# Patient Record
Sex: MALE | Race: OTHER | Employment: FULL TIME | URBAN - METROPOLITAN AREA
[De-identification: names, ages, dates, MRNs, and addresses within clinical notes are randomized per-mention and may not be internally consistent; named-entity substitution may affect disease eponyms.]

---

## 2017-07-20 PROBLEM — A60.00 HERPES SIMPLEX INFECTION OF GENITOURINARY SYSTEM: Status: ACTIVE | Noted: 2017-07-20

## 2017-07-20 PROBLEM — Z12.11 COLON CANCER SCREENING: Status: ACTIVE | Noted: 2017-07-20

## 2019-09-25 PROBLEM — Z12.11 COLON CANCER SCREENING: Status: RESOLVED | Noted: 2017-07-20 | Resolved: 2019-09-25

## 2019-10-04 PROBLEM — E78.5 DYSLIPIDEMIA: Status: ACTIVE | Noted: 2019-10-04

## 2019-10-04 PROBLEM — R05.9 COUGH: Status: ACTIVE | Noted: 2019-10-04

## 2019-11-21 ENCOUNTER — HOSPITAL ENCOUNTER (OUTPATIENT)
Dept: CT IMAGING | Age: 54
Discharge: HOME | End: 2019-11-21
Attending: UROLOGY
Payer: COMMERCIAL

## 2019-11-21 DIAGNOSIS — R31.29 MICROSCOPIC HEMATURIA: ICD-10-CM

## 2019-11-21 PROCEDURE — 74011636320 HC RX REV CODE- 636/320: Performed by: UROLOGY

## 2019-11-21 PROCEDURE — 74011000258 HC RX REV CODE- 258: Performed by: UROLOGY

## 2019-11-21 PROCEDURE — 74178 CT ABD&PLV WO CNTR FLWD CNTR: CPT

## 2019-11-21 RX ORDER — SODIUM CHLORIDE 0.9 % (FLUSH) 0.9 %
10 SYRINGE (ML) INJECTION
Status: COMPLETED | OUTPATIENT
Start: 2019-11-21 | End: 2019-11-21

## 2019-11-21 RX ADMIN — SODIUM CHLORIDE 100 ML: 900 INJECTION, SOLUTION INTRAVENOUS at 16:28

## 2019-11-21 RX ADMIN — Medication 10 ML: at 16:28

## 2019-11-21 RX ADMIN — IOPAMIDOL 100 ML: 755 INJECTION, SOLUTION INTRAVENOUS at 16:28

## 2019-11-21 NOTE — PROGRESS NOTES
Patient complains of itching after contrast injection. Monitored patient during exam and for 20 minutes after. He says he is not itching anymore, radiologist evaluated patient and he was discharged without additional medication.

## 2020-09-22 ENCOUNTER — HOSPITAL ENCOUNTER (OUTPATIENT)
Dept: LAB | Age: 55
Discharge: HOME | End: 2020-09-22
Payer: COMMERCIAL

## 2020-09-22 DIAGNOSIS — E78.5 DYSLIPIDEMIA: ICD-10-CM

## 2020-09-22 DIAGNOSIS — Z00.01 ENCOUNTER FOR GENERAL ADULT MEDICAL EXAMINATION WITH ABNORMAL FINDINGS: ICD-10-CM

## 2020-09-22 DIAGNOSIS — R73.03 DIABETES MELLITUS, LATENT: ICD-10-CM

## 2020-09-22 DIAGNOSIS — C95.91 LEUKEMIA IN REMISSION, UNSPECIFIED LEUKEMIA TYPE (HCC): ICD-10-CM

## 2020-09-22 DIAGNOSIS — Z79.899 ENCOUNTER FOR LONG-TERM (CURRENT) USE OF OTHER MEDICATIONS: ICD-10-CM

## 2020-09-22 LAB
ALBUMIN SERPL-MCNC: 4 G/DL (ref 3.5–5)
ALBUMIN/GLOB SERPL: 1.4 {RATIO} (ref 1.2–3.5)
ALP SERPL-CCNC: 73 U/L (ref 50–136)
ALT SERPL-CCNC: 32 U/L (ref 12–65)
ANION GAP SERPL CALC-SCNC: 5 MMOL/L (ref 7–16)
APPEARANCE UR: CLEAR
AST SERPL-CCNC: 16 U/L (ref 15–37)
BACTERIA URNS QL MICRO: 0 /HPF
BILIRUB SERPL-MCNC: 0.5 MG/DL (ref 0.2–1.1)
BILIRUB UR QL: NEGATIVE
BUN SERPL-MCNC: 15 MG/DL (ref 6–23)
CALCIUM SERPL-MCNC: 8.8 MG/DL (ref 8.3–10.4)
CASTS URNS QL MICRO: ABNORMAL /LPF
CHLORIDE SERPL-SCNC: 106 MMOL/L (ref 98–107)
CHOLEST SERPL-MCNC: 154 MG/DL
CO2 SERPL-SCNC: 27 MMOL/L (ref 21–32)
COLOR UR: YELLOW
CREAT SERPL-MCNC: 1.02 MG/DL (ref 0.8–1.5)
EPI CELLS #/AREA URNS HPF: 0 /HPF
ERYTHROCYTE [DISTWIDTH] IN BLOOD BY AUTOMATED COUNT: 13.8 % (ref 11.9–14.6)
EST. AVERAGE GLUCOSE BLD GHB EST-MCNC: 123 MG/DL
GLOBULIN SER CALC-MCNC: 2.9 G/DL (ref 2.3–3.5)
GLUCOSE SERPL-MCNC: 88 MG/DL (ref 65–100)
GLUCOSE UR STRIP.AUTO-MCNC: NEGATIVE MG/DL
HBA1C MFR BLD: 5.9 % (ref 4.8–6)
HCT VFR BLD AUTO: 43.8 % (ref 41.1–50.3)
HDLC SERPL-MCNC: 35 MG/DL (ref 40–60)
HDLC SERPL: 4.4 {RATIO}
HGB BLD-MCNC: 14.3 G/DL (ref 13.6–17.2)
HGB UR QL STRIP: ABNORMAL
KETONES UR QL STRIP.AUTO: NEGATIVE MG/DL
LDLC SERPL CALC-MCNC: 102.6 MG/DL
LEUKOCYTE ESTERASE UR QL STRIP.AUTO: NEGATIVE
LIPID PROFILE,FLP: ABNORMAL
MCH RBC QN AUTO: 30.9 PG (ref 26.1–32.9)
MCHC RBC AUTO-ENTMCNC: 32.6 G/DL (ref 31.4–35)
MCV RBC AUTO: 94.6 FL (ref 79.6–97.8)
NITRITE UR QL STRIP.AUTO: NEGATIVE
NRBC # BLD: 0 K/UL (ref 0–0.2)
PH UR STRIP: 6.5 [PH] (ref 5–9)
PLATELET # BLD AUTO: 122 K/UL (ref 150–450)
PMV BLD AUTO: 10.1 FL (ref 9.4–12.3)
POTASSIUM SERPL-SCNC: 4 MMOL/L (ref 3.5–5.1)
PROT SERPL-MCNC: 6.9 G/DL (ref 6.3–8.2)
PROT UR STRIP-MCNC: NEGATIVE MG/DL
RBC # BLD AUTO: 4.63 M/UL (ref 4.23–5.6)
RBC #/AREA URNS HPF: ABNORMAL /HPF
SODIUM SERPL-SCNC: 138 MMOL/L (ref 136–145)
SP GR UR REFRACTOMETRY: 1.02 (ref 1–1.02)
TRIGL SERPL-MCNC: 82 MG/DL (ref 35–150)
UROBILINOGEN UR QL STRIP.AUTO: 0.2 EU/DL (ref 0.2–1)
VLDLC SERPL CALC-MCNC: 16.4 MG/DL (ref 6–23)
WBC # BLD AUTO: 60.4 K/UL (ref 4.3–11.1)
WBC URNS QL MICRO: 0 /HPF

## 2020-09-22 PROCEDURE — 36415 COLL VENOUS BLD VENIPUNCTURE: CPT

## 2020-09-22 PROCEDURE — 80061 LIPID PANEL: CPT

## 2020-09-22 PROCEDURE — 80053 COMPREHEN METABOLIC PANEL: CPT

## 2020-09-22 PROCEDURE — 81001 URINALYSIS AUTO W/SCOPE: CPT

## 2020-09-22 PROCEDURE — 85027 COMPLETE CBC AUTOMATED: CPT

## 2020-09-22 PROCEDURE — 83036 HEMOGLOBIN GLYCOSYLATED A1C: CPT

## 2022-03-19 PROBLEM — R05.9 COUGH: Status: ACTIVE | Noted: 2019-10-04

## 2022-03-19 PROBLEM — E78.5 DYSLIPIDEMIA: Status: ACTIVE | Noted: 2019-10-04

## 2022-03-19 PROBLEM — A60.00 HERPES SIMPLEX INFECTION OF GENITOURINARY SYSTEM: Status: ACTIVE | Noted: 2017-07-20

## 2022-08-10 ENCOUNTER — HOSPITAL ENCOUNTER (EMERGENCY)
Age: 57
Discharge: HOME | End: 2022-08-10
Attending: EMERGENCY MEDICINE
Payer: COMMERCIAL

## 2022-08-10 ENCOUNTER — HOSPITAL ENCOUNTER (EMERGENCY)
Dept: GENERAL RADIOLOGY | Age: 57
Discharge: HOME | End: 2022-08-13
Payer: COMMERCIAL

## 2022-08-10 ENCOUNTER — HOSPITAL ENCOUNTER (EMERGENCY)
Dept: CT IMAGING | Age: 57
Discharge: HOME | End: 2022-08-13
Payer: COMMERCIAL

## 2022-08-10 VITALS
WEIGHT: 230 LBS | TEMPERATURE: 98.3 F | RESPIRATION RATE: 18 BRPM | HEIGHT: 69 IN | OXYGEN SATURATION: 96 % | DIASTOLIC BLOOD PRESSURE: 78 MMHG | BODY MASS INDEX: 34.07 KG/M2 | SYSTOLIC BLOOD PRESSURE: 116 MMHG | HEART RATE: 66 BPM
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
## 2022-08-10 DIAGNOSIS — S09.90XA CLOSED HEAD INJURY, INITIAL ENCOUNTER: ICD-10-CM

## 2022-08-10 DIAGNOSIS — S16.1XXA STRAIN OF NECK MUSCLE, INITIAL ENCOUNTER: ICD-10-CM

## 2022-08-10 DIAGNOSIS — M25.522 LEFT ELBOW PAIN: ICD-10-CM

## 2022-08-10 PROCEDURE — 70450 CT HEAD/BRAIN W/O DYE: CPT

## 2022-08-10 PROCEDURE — 73070 X-RAY EXAM OF ELBOW: CPT

## 2022-08-10 PROCEDURE — 72125 CT NECK SPINE W/O DYE: CPT

## 2022-08-10 PROCEDURE — 73502 X-RAY EXAM HIP UNI 2-3 VIEWS: CPT

## 2022-08-10 PROCEDURE — 99284 EMERGENCY DEPT VISIT MOD MDM: CPT

## 2022-08-10 RX ORDER — METHOCARBAMOL 500 MG/1
1000 TABLET, FILM COATED ORAL 3 TIMES DAILY
Qty: 42 TABLET | Refills: 0 | Status: SHIPPED | OUTPATIENT
Start: 2022-08-10 | End: 2022-08-17

## 2022-08-10 ASSESSMENT — ENCOUNTER SYMPTOMS
GASTROINTESTINAL NEGATIVE: 1
RESPIRATORY NEGATIVE: 1

## 2022-08-10 ASSESSMENT — PAIN - FUNCTIONAL ASSESSMENT: PAIN_FUNCTIONAL_ASSESSMENT: 0-10

## 2022-08-10 ASSESSMENT — PAIN SCALES - GENERAL: PAINLEVEL_OUTOF10: 6

## 2022-08-10 NOTE — ED NOTES
I have reviewed discharge instructions with the patient. The patient verbalized understanding. Patient left ED via Discharge Method: ambulatory to Home with self. Opportunity for questions and clarification provided. Patient given 1 scripts. To continue your aftercare when you leave the hospital, you may receive an automated call from our care team to check in on how you are doing. This is a free service and part of our promise to provide the best care and service to meet your aftercare needs.  If you have questions, or wish to unsubscribe from this service please call [phone]. Thank you for Choosing our [facility] Emergency Department.           Divya Valenzuela RN  08/10/22 4955
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
## 2022-08-10 NOTE — Clinical Note
Perez Cardenas was seen and treated in our emergency department on 8/10/2022. He may return to work on 08/11/2022. If you have any questions or concerns, please don't hesitate to call.       MACRINA Grady
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
## 2022-08-30 ENCOUNTER — TELEMEDICINE (OUTPATIENT)
Dept: PRIMARY CARE CLINIC | Facility: CLINIC | Age: 57
End: 2022-08-30
Payer: COMMERCIAL

## 2022-08-30 DIAGNOSIS — U07.1 COVID-19: Primary | ICD-10-CM

## 2022-08-30 DIAGNOSIS — C91.10 CHRONIC LYMPHOCYTIC LEUKEMIA (CLL) GENETIC MUTATION VARIANT (HCC): ICD-10-CM

## 2022-08-30 DIAGNOSIS — E66.01 SEVERE OBESITY (BMI 35.0-35.9 WITH COMORBIDITY) (HCC): ICD-10-CM

## 2022-08-30 DIAGNOSIS — Z87.891 EX-SMOKER: ICD-10-CM

## 2022-08-30 DIAGNOSIS — J44.1 COPD EXACERBATION (HCC): ICD-10-CM

## 2022-08-30 PROCEDURE — 99214 OFFICE O/P EST MOD 30 MIN: CPT | Performed by: FAMILY MEDICINE

## 2022-08-30 RX ORDER — NIRMATRELVIR AND RITONAVIR 300-100 MG
KIT ORAL
Qty: 30 TABLET | Refills: 0 | Status: SHIPPED | OUTPATIENT
Start: 2022-08-30 | End: 2022-09-04

## 2022-08-30 RX ORDER — AMOXICILLIN 500 MG/1
1000 CAPSULE ORAL 2 TIMES DAILY
Qty: 40 CAPSULE | Refills: 0 | Status: SHIPPED | OUTPATIENT
Start: 2022-08-30 | End: 2022-09-09

## 2022-08-30 RX ORDER — AZITHROMYCIN 250 MG/1
250 TABLET, FILM COATED ORAL SEE ADMIN INSTRUCTIONS
Qty: 6 TABLET | Refills: 0 | Status: SHIPPED | OUTPATIENT
Start: 2022-08-30 | End: 2022-09-04

## 2022-08-30 RX ORDER — MULTIVIT-MIN/IRON FUM/FOLIC AC 7.5 MG-4
1 TABLET ORAL DAILY
Qty: 100 TABLET | Refills: 3 | Status: SHIPPED | OUTPATIENT
Start: 2022-08-30 | End: 2022-10-11 | Stop reason: SDUPTHER

## 2022-08-30 ASSESSMENT — ENCOUNTER SYMPTOMS
TROUBLE SWALLOWING: 0
NAUSEA: 0
WHEEZING: 0
DIARRHEA: 0
RECTAL PAIN: 0
SORE THROAT: 1
COUGH: 1
ALLERGIC/IMMUNOLOGIC NEGATIVE: 1
SHORTNESS OF BREATH: 0

## 2022-08-30 ASSESSMENT — PATIENT HEALTH QUESTIONNAIRE - PHQ9
2. FEELING DOWN, DEPRESSED OR HOPELESS: 0
SUM OF ALL RESPONSES TO PHQ QUESTIONS 1-9: 0
1. LITTLE INTEREST OR PLEASURE IN DOING THINGS: 0
SUM OF ALL RESPONSES TO PHQ9 QUESTIONS 1 & 2: 0
SUM OF ALL RESPONSES TO PHQ QUESTIONS 1-9: 0

## 2022-08-30 NOTE — PATIENT INSTRUCTIONS
Upper respiratory infection cough congestion sinus drainage, and most ear infection bronchitis, or viral self-limiting, antibiotics are not necessary but sometimes prescribed due to patient concerns, continuing fluid hydration over-the-counter cough medicine could be taken at bedtime, may help some with cough, but most are ineffective, not recommended. Fluid hydration is more appropriate.   If the fever persisted productive phlegm, worsening symptoms after a few days further evaluation antibiotics as appropriate are necessary, need follow-up,  Recheck, if symptoms worsen particularly fever productive phlegm, malaise weakness, poor appetite  Acetaminophen ibuprofen, over-the-counter can be taken for aches and pains of fever as needed for 2-3 days,      Can follow-up with hematology for chronic lymphocytic leukemia

## 2022-08-30 NOTE — PROGRESS NOTES
Complains of fever sore throat cough with some productive phlegm no wheezing shortness of breath. He tested positive for COVID yesterday. Started symptoms 3 to 4 days ago. Previously had COVID in January and also vaccinated for Rashaad. He is at high risk due to history smoking also chronic lymphocytic leukemia. His last white cell count was 60. Recommended follow-up with hematology oncology. Obesity he denies any weight change. Review of Systems   Constitutional:  Positive for fever. HENT:  Positive for congestion and sore throat. Negative for dental problem, ear pain, mouth sores, postnasal drip and trouble swallowing. Respiratory:  Positive for cough. Negative for shortness of breath and wheezing. Gastrointestinal:  Negative for diarrhea, nausea and rectal pain. Endocrine: Negative. Genitourinary: Negative. Negative for decreased urine volume, difficulty urinating and urgency. Musculoskeletal: Negative. Allergic/Immunologic: Negative. Neurological:  Negative for speech difficulty, light-headedness and headaches. Hematological:  Negative for adenopathy. Does not bruise/bleed easily. Psychiatric/Behavioral:  Negative for agitation, behavioral problems and dysphoric mood. Physical Exam  Constitutional:       Appearance: Normal appearance. HENT:      Head: Normocephalic and atraumatic. Pulmonary:      Effort: Pulmonary effort is normal.   Musculoskeletal:      Cervical back: Normal range of motion. Skin:     Coloration: Skin is not jaundiced or pale. Findings: No bruising or lesion. Neurological:      General: No focal deficit present. Mental Status: He is alert. Mental status is at baseline. Coordination: Coordination normal.   Psychiatric:         Mood and Affect: Mood normal.      Fever cough however no significant wheezing shortness of breath some change in functional status but there are no obvious bruises. No pallor.   History chronic lymphocytic leukemia  1. COVID-19  COVID history chronic smoking COPD chronic bronchitis with acute exacerbation. We will treat with Paxil with antibiotics due to extremely high risk for complication including history chronic lymphocytic leukemia.  - azithromycin (ZITHROMAX) 250 MG tablet; Take 1 tablet by mouth See Admin Instructions for 5 days 500mg on day 1 followed by 250mg on days 2 - 5  Dispense: 6 tablet; Refill: 0  Upper respiratory infection cough congestion sinus drainage, and most ear infection bronchitis, or viral self-limiting, antibiotics are not necessary but sometimes prescribed due to patient concerns, continuing fluid hydration over-the-counter cough medicine could be taken at bedtime, may help some with cough, but most are ineffective, not recommended. Fluid hydration is more appropriate. If the fever persisted productive phlegm, worsening symptoms after a few days further evaluation antibiotics as appropriate are necessary, need follow-up,  Recheck, if symptoms worsen particularly fever productive phlegm, malaise weakness, poor appetite  Acetaminophen ibuprofen, over-the-counter can be taken for aches and pains of fever as needed for 2-3 days,    2. Chronic lymphocytic leukemia (CLL) genetic mutation variant (HCC)  Recommend follow-up with heme oncology due to history chronic lymphocytic leukemia monitoring management    3. Ex-smoker  Has quit smoking    4. COPD exacerbation (HCC)  Antibody recommended due to COPD sedation however minimal benefit from inhalers  - amoxicillin (AMOXIL) 500 MG capsule; Take 2 capsules by mouth 2 times daily for 10 days  Dispense: 40 capsule; Refill: 0  - nirmatrelvir/ritonavir (PAXLOVID, 300/100,) 20 x 150 MG & 10 x 100MG TBPK; Take 3 tablets (two 150 mg nirmatrelvir and one 100 mg ritonavir tablets) by mouth every 12 hours for 5 days. Dispense: 30 tablet; Refill: 0    5.  Severe obesity (BMI 35.0-35.9 with comorbidity) ([facility] [address]  Diet exercise weight management    Consent:  She and/or her healthcare decision maker is aware that this patient-initiated Telehealth encounter is a billable service, with coverage as determined by her insurance carrier. She is aware that she may receive a bill and has provided verbal consent to proceed: Yes    This virtual visit was conducted via [employer]. Pursuant to the emergency declaration under the [facility], [facility] waiver authority and the [employer] and [employer] Act, this Virtual  Visit was conducted to reduce the patient's risk of exposure to COVID-19 and provide continuity of care for an established patient. Services were provided through a video synchronous discussion virtually to substitute for in-person clinic visit. Due to this being a TeleHealth evaluation, many elements of the physical examination are unable to be assessed. Total Time: minutes: 20-25minutes.         Lisa Malloy MD

## 2022-10-11 ENCOUNTER — OFFICE VISIT (OUTPATIENT)
Dept: PRIMARY CARE CLINIC | Facility: CLINIC | Age: 57
End: 2022-10-11
Payer: COMMERCIAL

## 2022-10-11 VITALS
SYSTOLIC BLOOD PRESSURE: 109 MMHG | HEART RATE: 66 BPM | DIASTOLIC BLOOD PRESSURE: 78 MMHG | WEIGHT: 242 LBS | OXYGEN SATURATION: 95 % | HEIGHT: 69 IN | RESPIRATION RATE: 15 BRPM | BODY MASS INDEX: 35.84 KG/M2 | TEMPERATURE: 97.4 F

## 2022-10-11 DIAGNOSIS — Z11.4 ENCOUNTER FOR SCREENING FOR HIV: ICD-10-CM

## 2022-10-11 DIAGNOSIS — E66.01 SEVERE OBESITY (BMI 35.0-35.9 WITH COMORBIDITY) (HCC): ICD-10-CM

## 2022-10-11 DIAGNOSIS — E78.5 DYSLIPIDEMIA: ICD-10-CM

## 2022-10-11 DIAGNOSIS — Z11.59 ENCOUNTER FOR HEPATITIS C SCREENING TEST FOR LOW RISK PATIENT: ICD-10-CM

## 2022-10-11 DIAGNOSIS — Z00.01 ENCOUNTER FOR GENERAL ADULT MEDICAL EXAMINATION WITH ABNORMAL FINDINGS: Primary | ICD-10-CM

## 2022-10-11 DIAGNOSIS — N52.9 ERECTILE DYSFUNCTION, UNSPECIFIED ERECTILE DYSFUNCTION TYPE: ICD-10-CM

## 2022-10-11 DIAGNOSIS — Z00.01 ENCOUNTER FOR GENERAL ADULT MEDICAL EXAMINATION WITH ABNORMAL FINDINGS: ICD-10-CM

## 2022-10-11 DIAGNOSIS — R73.03 PRE-DIABETES: ICD-10-CM

## 2022-10-11 DIAGNOSIS — C91.10 CHRONIC LYMPHOCYTIC LEUKEMIA (CLL) GENETIC MUTATION VARIANT (HCC): ICD-10-CM

## 2022-10-11 DIAGNOSIS — Z79.899 MEDICATION MANAGEMENT: ICD-10-CM

## 2022-10-11 DIAGNOSIS — Z23 ENCOUNTER FOR IMMUNIZATION: ICD-10-CM

## 2022-10-11 DIAGNOSIS — H53.9 ABNORMAL VISION: ICD-10-CM

## 2022-10-11 PROBLEM — J44.1 COPD EXACERBATION (HCC): Status: RESOLVED | Noted: 2022-08-30 | Resolved: 2022-10-11

## 2022-10-11 PROBLEM — A60.00 HERPES SIMPLEX INFECTION OF GENITOURINARY SYSTEM: Status: RESOLVED | Noted: 2017-07-20 | Resolved: 2022-10-11

## 2022-10-11 PROBLEM — U07.1 COVID-19: Status: RESOLVED | Noted: 2022-08-30 | Resolved: 2022-10-11

## 2022-10-11 LAB
BASOPHILS # BLD: 0 K/UL (ref 0–0.2)
BASOPHILS NFR BLD: 0 % (ref 0–2)
BILIRUBIN, URINE, POC: NEGATIVE
BLOOD URINE, POC: ABNORMAL
DIFFERENTIAL METHOD BLD: ABNORMAL
EOSINOPHIL # BLD: 0 K/UL (ref 0–0.8)
EOSINOPHIL NFR BLD: 0 % (ref 0.5–7.8)
ERYTHROCYTE [DISTWIDTH] IN BLOOD BY AUTOMATED COUNT: 14.1 % (ref 11.9–14.6)
EST. AVERAGE GLUCOSE BLD GHB EST-MCNC: 120 MG/DL
GLUCOSE URINE, POC: NEGATIVE
HBA1C MFR BLD: 5.8 % (ref 4.8–5.6)
HCT VFR BLD AUTO: 44.2 % (ref 41.1–50.3)
HGB BLD-MCNC: 14 G/DL (ref 13.6–17.2)
IMM GRANULOCYTES # BLD AUTO: 0 K/UL (ref 0–0.5)
IMM GRANULOCYTES NFR BLD AUTO: 0 % (ref 0–5)
KETONES, URINE, POC: NEGATIVE
LEUKOCYTE ESTERASE, URINE, POC: NEGATIVE
LYMPHOCYTES # BLD: 64.3 K/UL (ref 0.5–4.6)
LYMPHOCYTES NFR BLD: 93 % (ref 13–44)
MCH RBC QN AUTO: 30.4 PG (ref 26.1–32.9)
MCHC RBC AUTO-ENTMCNC: 31.7 G/DL (ref 31.4–35)
MCV RBC AUTO: 95.9 FL (ref 82–102)
MONOCYTES # BLD: 1.4 K/UL (ref 0.1–1.3)
MONOCYTES NFR BLD: 2 % (ref 4–12)
NEUTS SEG # BLD: 3.5 K/UL (ref 1.7–8.2)
NEUTS SEG NFR BLD: 5 % (ref 43–78)
NITRITE, URINE, POC: NEGATIVE
NRBC # BLD: 0 K/UL (ref 0–0.2)
PH, URINE, POC: 6 (ref 4.6–8)
PLATELET # BLD AUTO: 88 K/UL (ref 150–450)
PLATELET COMMENT: ABNORMAL
PMV BLD AUTO: 10.2 FL (ref 9.4–12.3)
PROTEIN,URINE, POC: NEGATIVE
RBC # BLD AUTO: 4.61 M/UL (ref 4.23–5.6)
RBC MORPH BLD: ABNORMAL
RBC MORPH BLD: ABNORMAL
SPECIFIC GRAVITY, URINE, POC: 1.02 (ref 1–1.03)
URINALYSIS CLARITY, POC: CLEAR
URINALYSIS COLOR, POC: YELLOW
UROBILINOGEN, POC: ABNORMAL
WBC # BLD AUTO: 69.2 K/UL (ref 4.3–11.1)
WBC MORPH BLD: ABNORMAL

## 2022-10-11 PROCEDURE — 90674 CCIIV4 VAC NO PRSV 0.5 ML IM: CPT | Performed by: FAMILY MEDICINE

## 2022-10-11 PROCEDURE — 81003 URINALYSIS AUTO W/O SCOPE: CPT | Performed by: FAMILY MEDICINE

## 2022-10-11 PROCEDURE — 99396 PREV VISIT EST AGE 40-64: CPT | Performed by: FAMILY MEDICINE

## 2022-10-11 PROCEDURE — 90471 IMMUNIZATION ADMIN: CPT | Performed by: FAMILY MEDICINE

## 2022-10-11 RX ORDER — MULTIVIT-MIN/IRON FUM/FOLIC AC 7.5 MG-4
1 TABLET ORAL DAILY
Qty: 100 TABLET | Refills: 3 | Status: SHIPPED | OUTPATIENT
Start: 2022-10-11

## 2022-10-11 RX ORDER — SILDENAFIL 50 MG/1
TABLET, FILM COATED ORAL
Qty: 30 TABLET | Refills: 5 | Status: SHIPPED | OUTPATIENT
Start: 2022-10-11

## 2022-10-11 ASSESSMENT — ENCOUNTER SYMPTOMS
NAUSEA: 0
COLOR CHANGE: 0
DIARRHEA: 0
EYE REDNESS: 0
RHINORRHEA: 0
CONSTIPATION: 0
SHORTNESS OF BREATH: 0
WHEEZING: 0
SINUS PAIN: 0
VOICE CHANGE: 0
EYE PAIN: 0
EYE DISCHARGE: 0
PHOTOPHOBIA: 0
COUGH: 0
SINUS PRESSURE: 0
CHEST TIGHTNESS: 0
CHOKING: 0
TROUBLE SWALLOWING: 0
BACK PAIN: 0
ABDOMINAL PAIN: 0
ABDOMINAL DISTENTION: 0
BLOOD IN STOOL: 0
VOMITING: 0
SORE THROAT: 0

## 2022-10-11 ASSESSMENT — PATIENT HEALTH QUESTIONNAIRE - PHQ9
SUM OF ALL RESPONSES TO PHQ QUESTIONS 1-9: 0
SUM OF ALL RESPONSES TO PHQ9 QUESTIONS 1 & 2: 0
1. LITTLE INTEREST OR PLEASURE IN DOING THINGS: 0
SUM OF ALL RESPONSES TO PHQ QUESTIONS 1-9: 0
2. FEELING DOWN, DEPRESSED OR HOPELESS: 0
SUM OF ALL RESPONSES TO PHQ QUESTIONS 1-9: 0
SUM OF ALL RESPONSES TO PHQ QUESTIONS 1-9: 0

## 2022-10-11 NOTE — PROGRESS NOTES
Here for follow-up annual wellness exam.  Obesity BMI 35 dyslipidemia. History chronic lymphocytic leukemia. He is in remission and has follow-up with hematology. Complains of erectile dysfunction. His vision screen abnormal on the right side ophthalmology follow-up 20/200 left side normal.  He has seen ophthalmology last year apparently. His glasses were prescribed he is not wearing those. There is no suggestion significant cataract however need evaluation recheck. Discussed management of erectile dysfunction no urinary symptoms had a colonoscopy in the past and hernia repair no other significant surgeries. Family history significant for cancer, Hypertension cardiovascular disease    Review of Systems   Constitutional:  Negative for activity change, appetite change, chills, diaphoresis, fatigue, fever and unexpected weight change. HENT:  Negative for congestion, ear pain, hearing loss, nosebleeds, rhinorrhea, sinus pressure, sinus pain, sore throat, trouble swallowing and voice change. Eyes:  Negative for photophobia, pain, discharge, redness and visual disturbance. Respiratory:  Negative for cough, choking, chest tightness, shortness of breath and wheezing. Cardiovascular:  Negative for chest pain, palpitations and leg swelling. Gastrointestinal:  Negative for abdominal distention, abdominal pain, blood in stool, constipation, diarrhea, nausea and vomiting. Endocrine: Negative for cold intolerance, heat intolerance, polydipsia, polyphagia and polyuria. Genitourinary:  Negative for decreased urine volume, difficulty urinating, dysuria, enuresis, frequency, genital sores, hematuria, penile discharge, penile pain, testicular pain and urgency. Sometimes difficulty having erection   Musculoskeletal:  Negative for arthralgias, back pain, gait problem, joint swelling, myalgias and neck pain. Skin:  Negative for color change and rash.    Allergic/Immunologic: Negative for environmental allergies and food allergies. Neurological:  Negative for dizziness, tremors, seizures, syncope, speech difficulty, weakness, numbness and headaches. Hematological:  Negative for adenopathy. Does not bruise/bleed easily. Psychiatric/Behavioral:  Negative for agitation, behavioral problems, confusion, decreased concentration, dysphoric mood, hallucinations, self-injury, sleep disturbance and suicidal ideas. The patient is not nervous/anxious and is not hyperactive. Physical Exam     1. Encounter for general adult medical examination with abnormal findings  Annual physical with several medical problems including history chronic lymphocytic leukemia hematology follow-up  - Lipid Panel; Future  - CBC with Auto Differential; Future  - AMB POC BLOOD OCCULT QUAL FECAL HEMGLBN 1-3  - AMB POC URINALYSIS DIP STICK AUTO W/O MICRO    2. Encounter for immunization    - Influenza, FLUCELVAX, (age 10 mo+), IM, Preservative Free, 0.5 mL    3. Chronic lymphocytic [facility] (C[facility]) [facility] mutation [facility] ([facility]  Follow-up with hematology    4. Severe obesity (BMI 35.0-35.9 with comorbidity) ([facility]  Diabetic teaching, diet, increase vegetables- at least 5 portions a day, roughly half plate, beans, whole grains, grilled or baked white meats , dairy products, exercise at least an hr - brisk walk aerobic of choice, No sugar/ suggary drinks including juice/ fats/ fried foods.  starch- bread/ potato/ rice. ( It takes roughly 40 minute of walk  To burn a 12 oz regular drink/ juice). Cutting out 12 oz drink a day equals to roughly 4 lb weight a yr! Decrease screen time- TV, Video, computer , cell phones- recommended less than 2 hrs a day      5. Dyslipidemia  May benefit from statins  - Lipid Panel; Future low-cholesterol diet weight management    6. Medication management    - CBC with Auto Differential; Future  - Comprehensive Metabolic Panel; Future  - TSH; Future    7.  Encounter for hepatitis C screening test for low risk patient    - Hepatitis C Antibody; Future    8. Encounter for screening for HIV    - HIV 1/2 Ag/Ab, 4TH Generation,W Rflx Confirm; Future    9. Pre-diabetes  High risk for prediabetes diabetes check hemoglobin A1c diabetic diet weight management  - Hemoglobin A1C; Future    10. Abnormal vision  Abnormal vision right eye previously evaluated by ophthalmologist recheck, appropriate treatment evaluation  - [facility] - [id number] [facility]   . Increase fruits vegetables, fiber, whole grains, beans, exercise, tree nuts, will decrease risk of heart attacks and strokes, may reduce cancer risks     once a day multivitamin such as Cent[employer] silver store brand, due to benefit of folic acid vitamin D, has already mineral vitamin is recommended doses.   Multiple different vitamins not recommended may carry increased risk, including vitamin E, take foods rich in omega 3 and fibre, pills are not recommended, including omega 3 in high doses, may have increased risks     Jp Zayas MD

## 2022-10-11 NOTE — PATIENT INSTRUCTIONS
Diabetic teaching, diet, increase vegetables- at least 5 portions a day, roughly half plate, beans, whole grains, grilled or baked white meats , dairy products, exercise at least an hr - brisk walk aerobic of choice, No sugar/ suggary drinks including juice/ fats/ fried foods.  starch- bread/ potato/ rice. ( It takes roughly 40 minute of walk  To burn a 12 oz regular drink/ juice). Cutting out 12 oz drink a day equals to roughly 4 lb weight a yr! Decrease screen time- TV, Video, computer , cell phones- recommended less than 2 hrs a day  Type 2 diabetes is very common, obesity is the main reason for diabetes and  insulin resistance, most of the type 2 diabetes can be cured by weight management exercise. . Most type 2 diabetes has high insulin level  and high insulin level causes most of diabetic complications microvascular and macrovascular, damage to kidneys, eyes , cardiovascular , and neuropathy,, medications that correct insulin resistance such as metformin has been shown to decrease these complications by lowering insulin level and correcting insulin resistance. Frequent blood sugar checking is unnecessary    Frequent blood sugar checking is not necessity, normal person without diabetess fasting blood sugar is usually less than 105, after 3 -4 weeks of treatment, either diet alone, or diet and metformin, if fasting blood sugar less than 120, frequent BS checking is not necessary and continue diet exercise Metformin is enough. Starting metformin early and preventing diabetic complications. Exercise and weight management is most important    Adding insulin and continuing increasing dose,  not usually prevent diabetic complications .  Some newer medications that do not cause low BS, may help diabeted by supressing apetite and making pee sugar , may help loose weight ,  may be more beneficial when over weight, but are quite expensive and often not covered by insurance, long term benefits are not known, and do have lot of side effects and risks    High blood sugar less than 300 usually causes no symptoms and patient is unaware, of the diabetes, and causes a significant diabetic complications and #1 cause of losing legs , kidneys and eye sight and cardiovascular risk     Focusing on blood sugar does not prevent diabetic complication, but diet, exercise , weight management ,  metformin early on , do prevent diabetic complications    If insulin do become necessary, usually 30-40 unit long acting insulin taken bed time, with small frequent meals may be more beneficial, keeping fasting blood sugar less than 140, through diet , exercise, weight management and metformin- recommended as first line diabetic medication with GFR more than 30 by all most medical organizations, and need be continued with or without insulin. In normal weight persons BMI less than 25, may be insulin deficient and Insulin log acting usually less than 30 units may help , with or without metformin if fasting BS more than 140  . Increase fruits vegetables, fiber, whole grains, beans, exercise, tree nuts, will decrease risk of heart attacks and strokes, may reduce cancer risks     once a day multivitamin such as Centrum silver store brand, due to benefit of folic acid vitamin D, has already mineral vitamin is recommended doses. Multiple different vitamins not recommended may carry increased risk, including vitamin E, take foods rich in omega 3 and fibre, pills are not recommended, including omega 3 in high doses, may have increased risks     He will follow-up with oncology for chronic lymphocytic leukemia    Prostate cancer is extremely common in men, eventually all men may develop prostate cancer. Usually prostate cancer is very slow-growing, very few people may suffer from prostate cancer and very rarely die from prostate cancer, or treatment may have significant risks.   Prostate cancer screening not recommended by [facility] preventative services task force, and the risks of screening may exceed benefits assessed by [facility] preventative services task force,

## 2022-10-12 LAB
ALBUMIN SERPL-MCNC: 4 G/DL (ref 3.5–5)
ALBUMIN/GLOB SERPL: 1.7 {RATIO} (ref 0.4–1.6)
ALP SERPL-CCNC: 74 U/L (ref 50–136)
ALT SERPL-CCNC: 42 U/L (ref 12–65)
ANION GAP SERPL CALC-SCNC: 7 MMOL/L (ref 2–11)
AST SERPL-CCNC: 18 U/L (ref 15–37)
BILIRUB SERPL-MCNC: 0.5 MG/DL (ref 0.2–1.1)
BUN SERPL-MCNC: 13 MG/DL (ref 6–23)
CALCIUM SERPL-MCNC: 8.7 MG/DL (ref 8.3–10.4)
CHLORIDE SERPL-SCNC: 109 MMOL/L (ref 101–110)
CHOLEST SERPL-MCNC: 171 MG/DL
CO2 SERPL-SCNC: 23 MMOL/L (ref 21–32)
CREAT SERPL-MCNC: 1 MG/DL (ref 0.8–1.5)
GLOBULIN SER CALC-MCNC: 2.3 G/DL (ref 2.8–4.5)
GLUCOSE SERPL-MCNC: 96 MG/DL (ref 65–100)
HCV AB SER QL: NONREACTIVE
HDLC SERPL-MCNC: 30 MG/DL (ref 40–60)
HDLC SERPL: 5.7 {RATIO}
HIV 1+2 AB+HIV1 P24 AG SERPL QL IA: NONREACTIVE
HIV 1/2 RESULT COMMENT: NORMAL
LDLC SERPL CALC-MCNC: 102.4 MG/DL
POTASSIUM SERPL-SCNC: 3.9 MMOL/L (ref 3.5–5.1)
PROT SERPL-MCNC: 6.3 G/DL (ref 6.3–8.2)
SODIUM SERPL-SCNC: 139 MMOL/L (ref 133–143)
TRIGL SERPL-MCNC: 193 MG/DL (ref 35–150)
TSH, 3RD GENERATION: 2.21 UIU/ML (ref 0.36–3.74)
VLDLC SERPL CALC-MCNC: 38.6 MG/DL (ref 6–23)

## 2023-04-03 ENCOUNTER — OFFICE VISIT (OUTPATIENT)
Dept: PRIMARY CARE CLINIC | Facility: CLINIC | Age: 58
End: 2023-04-03
Payer: COMMERCIAL

## 2023-04-03 ENCOUNTER — TELEPHONE (OUTPATIENT)
Dept: PRIMARY CARE CLINIC | Facility: CLINIC | Age: 58
End: 2023-04-03

## 2023-04-03 VITALS
DIASTOLIC BLOOD PRESSURE: 75 MMHG | HEIGHT: 69 IN | TEMPERATURE: 97.6 F | SYSTOLIC BLOOD PRESSURE: 121 MMHG | BODY MASS INDEX: 36.14 KG/M2 | RESPIRATION RATE: 15 BRPM | HEART RATE: 62 BPM | OXYGEN SATURATION: 96 % | WEIGHT: 244 LBS

## 2023-04-03 DIAGNOSIS — R73.03 PRE-DIABETES: ICD-10-CM

## 2023-04-03 DIAGNOSIS — D69.6 THROMBOCYTOPENIA, UNSPECIFIED (HCC): ICD-10-CM

## 2023-04-03 DIAGNOSIS — R63.5 WEIGHT GAIN: ICD-10-CM

## 2023-04-03 DIAGNOSIS — E78.5 DYSLIPIDEMIA: ICD-10-CM

## 2023-04-03 DIAGNOSIS — N52.9 ERECTILE DYSFUNCTION, UNSPECIFIED ERECTILE DYSFUNCTION TYPE: ICD-10-CM

## 2023-04-03 DIAGNOSIS — M47.815 SPONDYLOSIS OF THORACOLUMBAR REGION WITHOUT MYELOPATHY OR RADICULOPATHY: ICD-10-CM

## 2023-04-03 DIAGNOSIS — E66.01 SEVERE OBESITY (BMI 35.0-39.9) WITH COMORBIDITY (HCC): ICD-10-CM

## 2023-04-03 DIAGNOSIS — C91.10 CHRONIC LYMPHOCYTIC LEUKEMIA (CLL) GENETIC MUTATION VARIANT (HCC): ICD-10-CM

## 2023-04-03 DIAGNOSIS — Z87.891 EX-SMOKER: ICD-10-CM

## 2023-04-03 DIAGNOSIS — M54.50 CHRONIC LEFT-SIDED LOW BACK PAIN, UNSPECIFIED WHETHER SCIATICA PRESENT: ICD-10-CM

## 2023-04-03 DIAGNOSIS — G89.29 CHRONIC LEFT-SIDED LOW BACK PAIN, UNSPECIFIED WHETHER SCIATICA PRESENT: ICD-10-CM

## 2023-04-03 DIAGNOSIS — M25.552 LEFT HIP PAIN: Primary | ICD-10-CM

## 2023-04-03 DIAGNOSIS — R10.9 LEFT FLANK PAIN: ICD-10-CM

## 2023-04-03 DIAGNOSIS — Z79.899 MEDICATION MANAGEMENT: ICD-10-CM

## 2023-04-03 DIAGNOSIS — E66.01 SEVERE OBESITY (BMI 35.0-35.9 WITH COMORBIDITY) (HCC): ICD-10-CM

## 2023-04-03 LAB
ALBUMIN SERPL-MCNC: 4.1 G/DL (ref 3.5–5)
ALBUMIN/GLOB SERPL: 1.6 (ref 0.4–1.6)
ALP SERPL-CCNC: 78 U/L (ref 50–136)
ALT SERPL-CCNC: 40 U/L (ref 12–65)
ANION GAP SERPL CALC-SCNC: 2 MMOL/L (ref 2–11)
AST SERPL-CCNC: 19 U/L (ref 15–37)
BASOPHILS # BLD: 0 K/UL (ref 0–0.2)
BASOPHILS NFR BLD: 0 % (ref 0–2)
BILIRUB SERPL-MCNC: 0.4 MG/DL (ref 0.2–1.1)
BILIRUBIN, URINE, POC: NEGATIVE
BLOOD URINE, POC: NORMAL
BUN SERPL-MCNC: 20 MG/DL (ref 6–23)
CALCIUM SERPL-MCNC: 9.1 MG/DL (ref 8.3–10.4)
CHLORIDE SERPL-SCNC: 106 MMOL/L (ref 101–110)
CHOLEST SERPL-MCNC: 193 MG/DL
CO2 SERPL-SCNC: 29 MMOL/L (ref 21–32)
CREAT SERPL-MCNC: 1 MG/DL (ref 0.8–1.5)
DIFFERENTIAL METHOD BLD: ABNORMAL
EOSINOPHIL # BLD: 0 K/UL (ref 0–0.8)
EOSINOPHIL NFR BLD: 0 % (ref 0.5–7.8)
ERYTHROCYTE [DISTWIDTH] IN BLOOD BY AUTOMATED COUNT: 14.2 % (ref 11.9–14.6)
EST. AVERAGE GLUCOSE BLD GHB EST-MCNC: 114 MG/DL
GLOBULIN SER CALC-MCNC: 2.6 G/DL (ref 2.8–4.5)
GLUCOSE SERPL-MCNC: 105 MG/DL (ref 65–100)
GLUCOSE URINE, POC: NEGATIVE
HBA1C MFR BLD: 5.6 % (ref 4.8–5.6)
HCT VFR BLD AUTO: 45.3 % (ref 41.1–50.3)
HDLC SERPL-MCNC: 28 MG/DL (ref 40–60)
HDLC SERPL: 6.9
HGB BLD-MCNC: 15 G/DL (ref 13.6–17.2)
IMM GRANULOCYTES # BLD AUTO: 0 K/UL (ref 0–0.5)
IMM GRANULOCYTES NFR BLD AUTO: 0 % (ref 0–5)
KETONES, URINE, POC: NEGATIVE
LDLC SERPL CALC-MCNC: 126.8 MG/DL
LEUKOCYTE ESTERASE, URINE, POC: NEGATIVE
LYMPHOCYTES # BLD: 64.1 K/UL (ref 0.5–4.6)
LYMPHOCYTES NFR BLD: 92 % (ref 13–44)
MCH RBC QN AUTO: 30.7 PG (ref 26.1–32.9)
MCHC RBC AUTO-ENTMCNC: 33.1 G/DL (ref 31.4–35)
MCV RBC AUTO: 92.6 FL (ref 82–102)
MONOCYTES # BLD: 1.4 K/UL (ref 0.1–1.3)
MONOCYTES NFR BLD: 2 % (ref 4–12)
NEUTS SEG # BLD: 4.2 K/UL (ref 1.7–8.2)
NEUTS SEG NFR BLD: 6 % (ref 43–78)
NITRITE, URINE, POC: NEGATIVE
NRBC # BLD: 0 K/UL (ref 0–0.2)
PH, URINE, POC: 5.5 (ref 4.6–8)
PLATELET # BLD AUTO: 103 K/UL (ref 150–450)
PLATELET COMMENT: ABNORMAL
PMV BLD AUTO: 10.6 FL (ref 9.4–12.3)
POTASSIUM SERPL-SCNC: 4.2 MMOL/L (ref 3.5–5.1)
PROT SERPL-MCNC: 6.7 G/DL (ref 6.3–8.2)
PROTEIN,URINE, POC: NEGATIVE
RBC # BLD AUTO: 4.89 M/UL (ref 4.23–5.6)
RBC MORPH BLD: ABNORMAL
SODIUM SERPL-SCNC: 137 MMOL/L (ref 133–143)
SPECIFIC GRAVITY, URINE, POC: 1.03 (ref 1–1.03)
TRIGL SERPL-MCNC: 191 MG/DL (ref 35–150)
TSH W FREE THYROID IF ABNORMAL: 1.28 UIU/ML (ref 0.36–3.74)
URINALYSIS CLARITY, POC: CLEAR
URINALYSIS COLOR, POC: YELLOW
UROBILINOGEN, POC: NORMAL
VLDLC SERPL CALC-MCNC: 38.2 MG/DL (ref 6–23)
WBC # BLD AUTO: 69.7 K/UL (ref 4.3–11.1)
WBC MORPH BLD: ABNORMAL

## 2023-04-03 PROCEDURE — 81003 URINALYSIS AUTO W/O SCOPE: CPT | Performed by: FAMILY MEDICINE

## 2023-04-03 PROCEDURE — 99214 OFFICE O/P EST MOD 30 MIN: CPT | Performed by: FAMILY MEDICINE

## 2023-04-03 RX ORDER — SILDENAFIL 50 MG/1
TABLET, FILM COATED ORAL
Qty: 30 TABLET | Refills: 5 | Status: SHIPPED | OUTPATIENT
Start: 2023-04-03

## 2023-04-03 SDOH — ECONOMIC STABILITY: INCOME INSECURITY: HOW HARD IS IT FOR YOU TO PAY FOR THE VERY BASICS LIKE FOOD, HOUSING, MEDICAL CARE, AND HEATING?: NOT HARD AT ALL

## 2023-04-03 SDOH — ECONOMIC STABILITY: FOOD INSECURITY: WITHIN THE PAST 12 MONTHS, YOU WORRIED THAT YOUR FOOD WOULD RUN OUT BEFORE YOU GOT MONEY TO BUY MORE.: NEVER TRUE

## 2023-04-03 SDOH — ECONOMIC STABILITY: HOUSING INSECURITY
IN THE LAST 12 MONTHS, WAS THERE A TIME WHEN YOU DID NOT HAVE A STEADY PLACE TO SLEEP OR SLEPT IN A SHELTER (INCLUDING NOW)?: NO

## 2023-04-03 SDOH — ECONOMIC STABILITY: FOOD INSECURITY: WITHIN THE PAST 12 MONTHS, THE FOOD YOU BOUGHT JUST DIDN'T LAST AND YOU DIDN'T HAVE MONEY TO GET MORE.: NEVER TRUE

## 2023-04-03 ASSESSMENT — PATIENT HEALTH QUESTIONNAIRE - PHQ9
SUM OF ALL RESPONSES TO PHQ QUESTIONS 1-9: 0
SUM OF ALL RESPONSES TO PHQ QUESTIONS 1-9: 0
1. LITTLE INTEREST OR PLEASURE IN DOING THINGS: 0
SUM OF ALL RESPONSES TO PHQ9 QUESTIONS 1 & 2: 0
2. FEELING DOWN, DEPRESSED OR HOPELESS: 0
SUM OF ALL RESPONSES TO PHQ QUESTIONS 1-9: 0
SUM OF ALL RESPONSES TO PHQ QUESTIONS 1-9: 0

## 2023-04-03 ASSESSMENT — ENCOUNTER SYMPTOMS
COLOR CHANGE: 0
COUGH: 0
CHEST TIGHTNESS: 0
SORE THROAT: 0
NAUSEA: 0
VOICE CHANGE: 0
BACK PAIN: 1
WHEEZING: 0
VOMITING: 0
SINUS PRESSURE: 0
SINUS PAIN: 0
ABDOMINAL DISTENTION: 0
CHOKING: 0
SHORTNESS OF BREATH: 0
EYE DISCHARGE: 0
DIARRHEA: 0
RHINORRHEA: 0
TROUBLE SWALLOWING: 0
EYE PAIN: 0
EYE REDNESS: 0
ABDOMINAL PAIN: 0
BLOOD IN STOOL: 0
PHOTOPHOBIA: 0
CONSTIPATION: 0

## 2023-04-03 NOTE — PROGRESS NOTES
Here for follow-up of her numerous medical problems. Complains of severe left flank buttock back pain it was bad last week however is better. Denies any hematuria or dysuria any fall injuries. Remote history of kidney stone. Last CT scan was normal.  He is still concerned for possibility of kidney stone. History erectile symptoms better on sildenafil. Obesity BMI 36 gained weight. Prediabetic hyperlipidemia. History chronic lymphocytic leukemia has upcoming hematology appointment follow-up. Previous history smoking. No smoking alcohol or drug abuse. He is up-to-date with colon cancer screening    Review of Systems   Constitutional:  Negative for activity change, appetite change, chills, diaphoresis, fatigue, fever and unexpected weight change. HENT:  Negative for congestion, ear pain, hearing loss, nosebleeds, rhinorrhea, sinus pressure, sinus pain, sore throat, trouble swallowing and voice change. Eyes:  Negative for photophobia, pain, discharge, redness and visual disturbance. Respiratory:  Negative for cough, choking, chest tightness, shortness of breath and wheezing. Cardiovascular:  Negative for chest pain, palpitations and leg swelling. Gastrointestinal:  Negative for abdominal distention, abdominal pain, blood in stool, constipation, diarrhea, nausea and vomiting. Endocrine: Negative for cold intolerance, polydipsia, polyphagia and polyuria. Genitourinary:  Positive for flank pain. Negative for decreased urine volume, difficulty urinating, dysuria, enuresis, frequency, genital sores, hematuria, penile discharge, penile pain, penile swelling, scrotal swelling, testicular pain and urgency. Musculoskeletal:  Positive for back pain and myalgias. Negative for arthralgias, gait problem, joint swelling, neck pain and neck stiffness. Skin:  Negative for color change, pallor, rash and wound. Allergic/Immunologic: Negative for environmental allergies and food allergies.    Neurological:

## 2023-04-03 NOTE — PATIENT INSTRUCTIONS
Metformin 500 mg once a day will help with the diabetes and may help lose weight. Ozempic 2 mg every week for weight loss if covered by insurance and will help for prediabetic, recommended.   Check with pharmacy if covered

## 2023-04-04 PROBLEM — D69.6 THROMBOCYTOPENIA, UNSPECIFIED (HCC): Status: ACTIVE | Noted: 2023-04-04

## 2023-04-05 ENCOUNTER — TELEPHONE (OUTPATIENT)
Dept: PRIMARY CARE CLINIC | Facility: CLINIC | Age: 58
End: 2023-04-05

## 2023-04-07 ENCOUNTER — TELEPHONE (OUTPATIENT)
Dept: PRIMARY CARE CLINIC | Facility: CLINIC | Age: 58
End: 2023-04-07

## 2023-04-07 NOTE — TELEPHONE ENCOUNTER
----- Message from Mary Grace Mcconnell MD sent at 4/4/2023  9:19 AM EDT -----  Labs okay keep follow-up with hematology oncology

## 2023-04-26 ENCOUNTER — TELEMEDICINE (OUTPATIENT)
Dept: PRIMARY CARE CLINIC | Facility: CLINIC | Age: 58
End: 2023-04-26
Payer: COMMERCIAL

## 2023-04-26 DIAGNOSIS — J01.91 ACUTE RECURRENT SINUSITIS, UNSPECIFIED LOCATION: Primary | ICD-10-CM

## 2023-04-26 DIAGNOSIS — R73.03 PRE-DIABETES: ICD-10-CM

## 2023-04-26 DIAGNOSIS — E66.01 SEVERE OBESITY (BMI 35.0-35.9 WITH COMORBIDITY) (HCC): ICD-10-CM

## 2023-04-26 DIAGNOSIS — J30.1 SEASONAL ALLERGIC RHINITIS DUE TO POLLEN: ICD-10-CM

## 2023-04-26 DIAGNOSIS — E78.5 DYSLIPIDEMIA: ICD-10-CM

## 2023-04-26 DIAGNOSIS — C91.10 CHRONIC LYMPHOCYTIC LEUKEMIA (CLL) GENETIC MUTATION VARIANT (HCC): ICD-10-CM

## 2023-04-26 PROCEDURE — 99213 OFFICE O/P EST LOW 20 MIN: CPT | Performed by: FAMILY MEDICINE

## 2023-04-26 RX ORDER — FLUTICASONE PROPIONATE 50 MCG
2 SPRAY, SUSPENSION (ML) NASAL DAILY
Qty: 48 G | Refills: 1 | Status: SHIPPED | OUTPATIENT
Start: 2023-04-26

## 2023-04-26 RX ORDER — AMOXICILLIN 500 MG/1
1000 CAPSULE ORAL 2 TIMES DAILY
Qty: 40 CAPSULE | Refills: 0 | Status: SHIPPED | OUTPATIENT
Start: 2023-04-26 | End: 2023-05-06

## 2023-04-26 ASSESSMENT — PATIENT HEALTH QUESTIONNAIRE - PHQ9
SUM OF ALL RESPONSES TO PHQ QUESTIONS 1-9: 0
SUM OF ALL RESPONSES TO PHQ9 QUESTIONS 1 & 2: 0
SUM OF ALL RESPONSES TO PHQ QUESTIONS 1-9: 0
1. LITTLE INTEREST OR PLEASURE IN DOING THINGS: 0
2. FEELING DOWN, DEPRESSED OR HOPELESS: 0
SUM OF ALL RESPONSES TO PHQ QUESTIONS 1-9: 0
SUM OF ALL RESPONSES TO PHQ QUESTIONS 1-9: 0

## 2023-04-26 ASSESSMENT — ENCOUNTER SYMPTOMS
COUGH: 1
EYES NEGATIVE: 1
APNEA: 0
STRIDOR: 0
GASTROINTESTINAL NEGATIVE: 1
SORE THROAT: 1
WHEEZING: 0
CHEST TIGHTNESS: 0
RHINORRHEA: 1

## 2023-04-26 NOTE — PROGRESS NOTES
Appropriations Act, this Virtual  Visit was conducted to reduce the patient's risk of exposure to COVID-19 and provide continuity of care for an established patient. Services were provided through a video synchronous discussion virtually to substitute for in-person clinic visit. Due to this being a TeleHealth evaluation, many elements of the physical examination are unable to be assessed. Total Time: minutes: 11-20 minutes.    MD Estephanie Sepulveda MD

## 2023-04-26 NOTE — PATIENT INSTRUCTIONS
Upper respiratory infection cough congestion sinus drainage, and most ear infection bronchitis, or viral self-limiting, antibiotics are not necessary but sometimes prescribed due to patient concerns, continuing fluid hydration over-the-counter cough medicine could be taken at bedtime, may help some with cough, but most are ineffective, not recommended. Fluid hydration is more appropriate.   If the fever persisted productive phlegm, worsening symptoms after a few days further evaluation antibiotics as appropriate are necessary, need follow-up,  Recheck, if symptoms worsen particularly fever productive phlegm, malaise weakness, poor appetite  Acetaminophen ibuprofen, over-the-counter can be taken for aches and pains of fever as needed for 2-3 days,

## 2023-06-30 ENCOUNTER — COMMUNITY OUTREACH (OUTPATIENT)
Dept: PRIMARY CARE CLINIC | Facility: CLINIC | Age: 58
End: 2023-06-30

## 2023-07-12 ENCOUNTER — OFFICE VISIT (OUTPATIENT)
Dept: PRIMARY CARE CLINIC | Facility: CLINIC | Age: 58
End: 2023-07-12
Payer: COMMERCIAL

## 2023-07-12 VITALS
SYSTOLIC BLOOD PRESSURE: 121 MMHG | HEART RATE: 53 BPM | OXYGEN SATURATION: 91 % | RESPIRATION RATE: 15 BRPM | TEMPERATURE: 97.7 F | WEIGHT: 237 LBS | DIASTOLIC BLOOD PRESSURE: 79 MMHG | HEIGHT: 69 IN | BODY MASS INDEX: 35.1 KG/M2

## 2023-07-12 DIAGNOSIS — E66.01 SEVERE OBESITY (BMI 35.0-35.9 WITH COMORBIDITY) (HCC): ICD-10-CM

## 2023-07-12 DIAGNOSIS — R42 DIZZINESS: ICD-10-CM

## 2023-07-12 DIAGNOSIS — D69.6 THROMBOCYTOPENIA, UNSPECIFIED (HCC): ICD-10-CM

## 2023-07-12 DIAGNOSIS — R73.03 PRE-DIABETES: ICD-10-CM

## 2023-07-12 DIAGNOSIS — D48.5 NEOPLASM OF UNCERTAIN BEHAVIOR OF SKIN: ICD-10-CM

## 2023-07-12 DIAGNOSIS — M79.10 MYALGIA: ICD-10-CM

## 2023-07-12 DIAGNOSIS — E78.5 DYSLIPIDEMIA: ICD-10-CM

## 2023-07-12 DIAGNOSIS — F41.9 ANXIETY: ICD-10-CM

## 2023-07-12 DIAGNOSIS — C91.10 CHRONIC LYMPHOCYTIC LEUKEMIA (CLL) GENETIC MUTATION VARIANT (HCC): Primary | ICD-10-CM

## 2023-07-12 DIAGNOSIS — Z87.891 EX-SMOKER: ICD-10-CM

## 2023-07-12 PROCEDURE — 99214 OFFICE O/P EST MOD 30 MIN: CPT | Performed by: FAMILY MEDICINE

## 2023-07-12 RX ORDER — DULOXETIN HYDROCHLORIDE 30 MG/1
30 CAPSULE, DELAYED RELEASE ORAL DAILY
Qty: 30 CAPSULE | Refills: 3 | Status: SHIPPED | OUTPATIENT
Start: 2023-07-12

## 2023-07-12 ASSESSMENT — ENCOUNTER SYMPTOMS
WHEEZING: 0
TROUBLE SWALLOWING: 0
CHOKING: 0
EYE REDNESS: 0
VOMITING: 0
CONSTIPATION: 0
COUGH: 0
NAUSEA: 0
ABDOMINAL PAIN: 0
SORE THROAT: 0
SHORTNESS OF BREATH: 0
DIARRHEA: 0
BACK PAIN: 0
ABDOMINAL DISTENTION: 0
PHOTOPHOBIA: 0
VOICE CHANGE: 0
CHEST TIGHTNESS: 0
RHINORRHEA: 0
SINUS PRESSURE: 0
EYE PAIN: 0
SINUS PAIN: 0
COLOR CHANGE: 0
BLOOD IN STOOL: 0
EYE DISCHARGE: 0

## 2023-07-12 NOTE — PATIENT INSTRUCTIONS
Dermatology follow-up for skin lesions. Cymbalta may help with myalgia and some anxiety. Take once a day multivitamins        Hematology oncology for chronic lymphocytic leukemia    Diabetic teaching, diet, increase vegetables- at least 5 portions a day, roughly half plate, beans, whole grains, grilled or baked white meats , dairy products, exercise at least an hr - brisk walk aerobic of choice, No sugar/ suggary drinks including juice/ fats/ fried foods.  starch- bread/ potato/ rice. ( It takes roughly 40 minute of walk  To burn a 12 oz regular drink/ juice). Cutting out 12 oz drink a day equals to roughly 4 lb weight a yr!  Decrease screen time- TV, Video, computer , cell phones- recommended less than 2 hrs a day

## 2023-07-12 NOTE — PROGRESS NOTES
Here for follow-up of her numerous medical problems chronic lymphocytic leukemia hemoglobin normal.  His hematologist oncology physician has retired. Will refer to heme-onc oncology oncology however no acute crisis this has been stable. Chronic multiple nonspecific pains. He wanted me to fill at[employer] papers sometimes he cannot go to work and also has occasional nonspecific dizziness. Recent lab test prediabetic hemoglobin A1c 5.6 continue diabetic diet. Obesity BMI 36. He has lost some weight. Hyperlipidemia continue low-cholesterol diet recent lab test reviewed discussed. He came in office with his daughter    Review of Systems   Constitutional:  Positive for fatigue. Negative for activity change, appetite change, chills, diaphoresis, fever and unexpected weight change. HENT:  Negative for congestion, ear pain, hearing loss, nosebleeds, rhinorrhea, sinus pressure, sinus pain, sore throat, trouble swallowing and voice change. Eyes:  Negative for photophobia, pain, discharge, redness and visual disturbance. Respiratory:  Negative for cough, choking, chest tightness, shortness of breath and wheezing. Cardiovascular:  Negative for chest pain, palpitations and leg swelling. Gastrointestinal:  Negative for abdominal distention, abdominal pain, blood in stool, constipation, diarrhea, nausea and vomiting. Endocrine: Negative for cold intolerance, heat intolerance, polydipsia, polyphagia and polyuria. Genitourinary:  Negative for difficulty urinating, dysuria, enuresis, flank pain, frequency, genital sores, hematuria, penile discharge, penile pain, penile swelling and urgency. Musculoskeletal:  Positive for myalgias. Negative for arthralgias, back pain, gait problem, joint swelling and neck pain. Skin:  Negative for color change, pallor, rash and wound. Allergic/Immunologic: Negative for environmental allergies, food allergies and immunocompromised state. Neurological:  Positive for dizziness.

## 2023-08-03 DIAGNOSIS — M25.552 LEFT HIP PAIN: ICD-10-CM

## 2023-08-03 NOTE — PROGRESS NOTES
New Patient Abstract    Reason for Referral: Chronic lymphocytic leukemia (CLL) genetic mutation variant     Referring Provider: Mina Verma MD    Primary Care Provider: Mina Verma MD    Family History of Cancer/Hematologic: Family history significant for mother with unknown cancer, father with unknown cancer and sister with unknown cancer. Presenting Symptoms: Abnormal blood smear, elevated WBC counts    Narrative with recent with Results/Procedures/Biopsies and Dates completed:     51-year-old male with history of CLL    3/16/23 - Met with Hematologist, Dr. Joey Moore ([facility])  History of CLL:  Previous seen by Dr. Janine Mac in May 2013 after an absence of almost 2 years. c/o acid reflux and abdominal pain, cough and SOB. Patient had an elevated WBC count of 23.6  His lymphocyte count was 15.6K. The peripheral smear suggested CLL. The flow cytometry confirmed CLL. He has adenopathy and splenomegaly, which will be a Stage II CLL. His wife wanted him to have a bone marrow test which was done in June of 2013. Bone marrow showed a hypercellular marrow with marked increase in lymphocytes which were atypical lymphocytes and were also in non-paratrabecular aggregates. Noted: The presence of 13q14 deletion in 84% of the cells is an unfavorable sign and the hypermutated IgVH is a favorable prognostic sign. Rise in the absolute lymphocyte count (ALC) from 16.8 to 60.7K in the between July 2013 and June 2019. Today, it is 58.8K. Initial worsening thrombocytopenia, which dropped and then became stable. ALC fell from 97 to 47K between June and September 2021. Was unsure due to recent COVID or the CLL. Today it is 91K. Mild COVID in August 2021 after vaccinations in early 2021. Probably due to his immunodeficient status. He had another infection in August 2022 and took a course of Paxlovid. Cholelithiasis and abdominal wall hernia.  He had surgery for the ventral hernia in November

## 2023-08-08 ENCOUNTER — CLINICAL DOCUMENTATION (OUTPATIENT)
Dept: ONCOLOGY | Age: 58
End: 2023-08-08

## 2023-08-08 ENCOUNTER — OFFICE VISIT (OUTPATIENT)
Dept: ONCOLOGY | Age: 58
End: 2023-08-08
Payer: COMMERCIAL

## 2023-08-08 ENCOUNTER — HOSPITAL ENCOUNTER (OUTPATIENT)
Dept: LAB | Age: 58
Discharge: HOME | End: 2023-08-11
Payer: COMMERCIAL

## 2023-08-08 VITALS
BODY MASS INDEX: 36.46 KG/M2 | WEIGHT: 240.6 LBS | RESPIRATION RATE: 16 BRPM | SYSTOLIC BLOOD PRESSURE: 114 MMHG | DIASTOLIC BLOOD PRESSURE: 73 MMHG | HEART RATE: 71 BPM | TEMPERATURE: 98.7 F | OXYGEN SATURATION: 97 % | HEIGHT: 68 IN

## 2023-08-08 DIAGNOSIS — C91.10 CHRONIC LYMPHOCYTIC LEUKEMIA (CLL) GENETIC MUTATION VARIANT (HCC): ICD-10-CM

## 2023-08-08 DIAGNOSIS — C91.10 CHRONIC LYMPHOCYTIC LEUKEMIA (CLL) GENETIC MUTATION VARIANT (HCC): Primary | ICD-10-CM

## 2023-08-08 LAB
ALBUMIN SERPL-MCNC: 3.9 G/DL (ref 3.5–5)
ALBUMIN/GLOB SERPL: 1.5 (ref 0.4–1.6)
ALP SERPL-CCNC: 70 U/L (ref 50–136)
ALT SERPL-CCNC: 41 U/L (ref 12–65)
ANION GAP SERPL CALC-SCNC: 4 MMOL/L (ref 2–11)
AST SERPL-CCNC: 18 U/L (ref 15–37)
BASOPHILS # BLD: 0 K/UL (ref 0–0.2)
BASOPHILS NFR BLD: 0 % (ref 0–2)
BILIRUB SERPL-MCNC: 0.6 MG/DL (ref 0.2–1.1)
BUN SERPL-MCNC: 18 MG/DL (ref 6–23)
CALCIUM SERPL-MCNC: 9.1 MG/DL (ref 8.3–10.4)
CHLORIDE SERPL-SCNC: 107 MMOL/L (ref 101–110)
CO2 SERPL-SCNC: 27 MMOL/L (ref 21–32)
CREAT SERPL-MCNC: 0.9 MG/DL (ref 0.8–1.5)
DIFFERENTIAL METHOD BLD: ABNORMAL
EOSINOPHIL # BLD: 0 K/UL (ref 0–0.8)
EOSINOPHIL NFR BLD: 0 % (ref 0.5–7.8)
ERYTHROCYTE [DISTWIDTH] IN BLOOD BY AUTOMATED COUNT: 13.6 % (ref 11.9–14.6)
GLOBULIN SER CALC-MCNC: 2.6 G/DL (ref 2.8–4.5)
GLUCOSE SERPL-MCNC: 95 MG/DL (ref 65–100)
HCT VFR BLD AUTO: 42.7 % (ref 41.1–50.3)
HGB BLD-MCNC: 13.8 G/DL (ref 13.6–17.2)
IMM GRANULOCYTES # BLD AUTO: 0 K/UL (ref 0–0.5)
IMM GRANULOCYTES NFR BLD AUTO: 0 % (ref 0–5)
LDH SERPL L TO P-CCNC: 126 U/L (ref 100–190)
LYMPHOCYTES # BLD: 54.5 K/UL (ref 0.5–4.6)
LYMPHOCYTES NFR BLD: 92 % (ref 13–44)
Lab: NORMAL
Lab: NORMAL
MCH RBC QN AUTO: 29.7 PG (ref 26.1–32.9)
MCHC RBC AUTO-ENTMCNC: 32.3 G/DL (ref 31.4–35)
MCV RBC AUTO: 91.8 FL (ref 82–102)
MONOCYTES # BLD: 0.6 K/UL (ref 0.1–1.3)
MONOCYTES NFR BLD: 1 % (ref 4–12)
NEUTS SEG # BLD: 4.1 K/UL (ref 1.7–8.2)
NEUTS SEG NFR BLD: 7 % (ref 43–78)
NRBC # BLD: 0 K/UL (ref 0–0.2)
PLATELET # BLD AUTO: 103 K/UL (ref 150–450)
PLATELET COMMENT: ABNORMAL
PMV BLD AUTO: 10 FL (ref 9.4–12.3)
POTASSIUM SERPL-SCNC: 4.2 MMOL/L (ref 3.5–5.1)
PROT SERPL-MCNC: 6.5 G/DL (ref 6.3–8.2)
RBC # BLD AUTO: 4.65 M/UL (ref 4.23–5.6)
RBC MORPH BLD: ABNORMAL
REFERENCE LAB: NORMAL
SODIUM SERPL-SCNC: 138 MMOL/L (ref 133–143)
WBC # BLD AUTO: 59.2 K/UL (ref 4.3–11.1)
WBC MORPH BLD: ABNORMAL

## 2023-08-08 PROCEDURE — 80053 COMPREHEN METABOLIC PANEL: CPT

## 2023-08-08 PROCEDURE — 99205 OFFICE O/P NEW HI 60 MIN: CPT | Performed by: INTERNAL MEDICINE

## 2023-08-08 PROCEDURE — 85025 COMPLETE CBC W/AUTO DIFF WBC: CPT

## 2023-08-08 PROCEDURE — 83615 LACTATE (LD) (LDH) ENZYME: CPT

## 2023-08-08 PROCEDURE — 36415 COLL VENOUS BLD VENIPUNCTURE: CPT

## 2023-08-08 ASSESSMENT — PATIENT HEALTH QUESTIONNAIRE - PHQ9
SUM OF ALL RESPONSES TO PHQ QUESTIONS 1-9: 0
1. LITTLE INTEREST OR PLEASURE IN DOING THINGS: 0
SUM OF ALL RESPONSES TO PHQ QUESTIONS 1-9: 0
2. FEELING DOWN, DEPRESSED OR HOPELESS: 0
SUM OF ALL RESPONSES TO PHQ9 QUESTIONS 1 & 2: 0
SUM OF ALL RESPONSES TO PHQ QUESTIONS 1-9: 0
SUM OF ALL RESPONSES TO PHQ QUESTIONS 1-9: 0

## 2023-08-08 NOTE — PROGRESS NOTES
showing hypercellular marrow with CLL: 13q deletion, hyper-mutated IgVH), previously seen and followed with Dr. Christiano Saldaña who has now retired. From July 2013 till June 2019 his ALC (absolute lymphocyte count) parish from 16-60 K. More recently had mild COVID 8/21 with reinfection in 8/22 for which she took Paxil with. Recently seen by PCP Dr. Neena Garcia, with WBC mostly stable at 69,000. Now referred to us to reestablish care with hematology. Reports feeling \"great\". Denies any B symptoms, new lumps or bumps. Reports on last visit with Dr. Christiano Saldaña, he was advised continued monitoring. In summary, middle-aged gentleman with longstanding history of CLL, stage II, without high risk features, monitored since 2013. No clinical evidence of progression or endorgan damage, and as such, continue monitoring    PLAN:  - Routine labs  - LDH  - Peripheral blood smear flow cytometry and CLL profile  - Abdominal ultrasound assess liver and spleen    RTC in 4 weeks with above, labs    Thank you Dr Neena Garcia for allowing us to paticipate in care of this pleasant patient. Please call w/ any quesions      Total time 60 min 50% in direct consultation about the patient's diagnosis and management. All questions answered.   Brice Titus MD  [address] [facility] Hematology Oncology  [address]  [address] [place] [address] Road  Office : [phone]  Fax : [phone]

## 2023-08-11 LAB
FLOW CYTOMETRY RESULTS: NORMAL
SPECIMEN SOURCE: NORMAL
TEST ORDERED: NORMAL

## 2023-09-13 ENCOUNTER — HOSPITAL ENCOUNTER (OUTPATIENT)
Dept: ULTRASOUND IMAGING | Age: 58
Discharge: HOME | End: 2023-09-16
Attending: INTERNAL MEDICINE

## 2023-09-13 DIAGNOSIS — C91.10 CHRONIC LYMPHOCYTIC LEUKEMIA (CLL) GENETIC MUTATION VARIANT (HCC): ICD-10-CM

## 2023-09-19 DIAGNOSIS — C91.10 CHRONIC LYMPHOCYTIC LEUKEMIA (CLL) GENETIC MUTATION VARIANT (HCC): Primary | ICD-10-CM

## 2023-09-20 ENCOUNTER — HOSPITAL ENCOUNTER (OUTPATIENT)
Dept: LAB | Age: 58
Discharge: HOME | End: 2023-09-23
Payer: COMMERCIAL

## 2023-09-20 ENCOUNTER — OFFICE VISIT (OUTPATIENT)
Dept: ONCOLOGY | Age: 58
End: 2023-09-20
Payer: COMMERCIAL

## 2023-09-20 ENCOUNTER — CLINICAL DOCUMENTATION (OUTPATIENT)
Dept: ONCOLOGY | Age: 58
End: 2023-09-20

## 2023-09-20 VITALS
HEART RATE: 72 BPM | SYSTOLIC BLOOD PRESSURE: 121 MMHG | HEIGHT: 67 IN | TEMPERATURE: 97.6 F | BODY MASS INDEX: 37.56 KG/M2 | RESPIRATION RATE: 16 BRPM | OXYGEN SATURATION: 96 % | DIASTOLIC BLOOD PRESSURE: 72 MMHG | WEIGHT: 239.3 LBS

## 2023-09-20 DIAGNOSIS — D72.820 LYMPHOCYTOSIS: ICD-10-CM

## 2023-09-20 DIAGNOSIS — C91.10 CHRONIC LYMPHOCYTIC LEUKEMIA (CLL) GENETIC MUTATION VARIANT (HCC): Primary | ICD-10-CM

## 2023-09-20 DIAGNOSIS — K76.0 FATTY LIVER: ICD-10-CM

## 2023-09-20 DIAGNOSIS — C91.10 CHRONIC LYMPHOCYTIC LEUKEMIA (CLL) GENETIC MUTATION VARIANT (HCC): ICD-10-CM

## 2023-09-20 PROBLEM — F33.1 MAJOR DEPRESSIVE DISORDER, RECURRENT, MODERATE (HCC): Status: ACTIVE | Noted: 2023-09-20

## 2023-09-20 PROBLEM — F33.9 MAJOR DEPRESSIVE DISORDER, RECURRENT, UNSPECIFIED (HCC): Status: ACTIVE | Noted: 2023-09-20

## 2023-09-20 PROBLEM — F33.0 MAJOR DEPRESSIVE DISORDER, RECURRENT, MILD (HCC): Status: ACTIVE | Noted: 2023-09-20

## 2023-09-20 PROBLEM — E11.9 TYPE 2 DIABETES MELLITUS (HCC): Status: ACTIVE | Noted: 2023-09-20

## 2023-09-20 LAB
ALBUMIN SERPL-MCNC: 4 G/DL (ref 3.5–5)
ALBUMIN/GLOB SERPL: 1.4 (ref 0.4–1.6)
ALP SERPL-CCNC: 73 U/L (ref 50–136)
ALT SERPL-CCNC: 39 U/L (ref 12–65)
ANION GAP SERPL CALC-SCNC: 5 MMOL/L (ref 2–11)
AST SERPL-CCNC: 23 U/L (ref 15–37)
BASOPHILS # BLD: 0 K/UL (ref 0–0.2)
BASOPHILS NFR BLD: 0 % (ref 0–2)
BILIRUB SERPL-MCNC: 0.8 MG/DL (ref 0.2–1.1)
BUN SERPL-MCNC: 20 MG/DL (ref 6–23)
CALCIUM SERPL-MCNC: 9 MG/DL (ref 8.3–10.4)
CHLORIDE SERPL-SCNC: 106 MMOL/L (ref 101–110)
CO2 SERPL-SCNC: 28 MMOL/L (ref 21–32)
CREAT SERPL-MCNC: 1 MG/DL (ref 0.8–1.5)
DIFFERENTIAL METHOD BLD: ABNORMAL
EOSINOPHIL # BLD: 0.6 K/UL (ref 0–0.8)
EOSINOPHIL NFR BLD: 1 % (ref 0.5–7.8)
ERYTHROCYTE [DISTWIDTH] IN BLOOD BY AUTOMATED COUNT: 13.7 % (ref 11.9–14.6)
GLOBULIN SER CALC-MCNC: 2.8 G/DL (ref 2.8–4.5)
GLUCOSE SERPL-MCNC: 119 MG/DL (ref 65–100)
HCT VFR BLD AUTO: 43.3 % (ref 41.1–50.3)
HGB BLD-MCNC: 14.1 G/DL (ref 13.6–17.2)
IMM GRANULOCYTES # BLD AUTO: 0 K/UL (ref 0–0.5)
IMM GRANULOCYTES NFR BLD AUTO: 0 % (ref 0–5)
LDH SERPL L TO P-CCNC: 176 U/L (ref 100–190)
LYMPHOCYTES # BLD: 56.2 K/UL (ref 0.5–4.6)
LYMPHOCYTES NFR BLD: 89 % (ref 13–44)
MCH RBC QN AUTO: 29.9 PG (ref 26.1–32.9)
MCHC RBC AUTO-ENTMCNC: 32.6 G/DL (ref 31.4–35)
MCV RBC AUTO: 91.9 FL (ref 82–102)
MONOCYTES # BLD: 1.3 K/UL (ref 0.1–1.3)
MONOCYTES NFR BLD: 2 % (ref 4–12)
NEUTS SEG # BLD: 5 K/UL (ref 1.7–8.2)
NEUTS SEG NFR BLD: 8 % (ref 43–78)
NRBC # BLD: 0 K/UL (ref 0–0.2)
PLATELET # BLD AUTO: 112 K/UL (ref 150–450)
PLATELET COMMENT: ABNORMAL
PMV BLD AUTO: 9.7 FL (ref 9.4–12.3)
POTASSIUM SERPL-SCNC: 4.2 MMOL/L (ref 3.5–5.1)
PROT SERPL-MCNC: 6.8 G/DL (ref 6.3–8.2)
RBC # BLD AUTO: 4.71 M/UL (ref 4.23–5.6)
RBC MORPH BLD: ABNORMAL
SODIUM SERPL-SCNC: 139 MMOL/L (ref 133–143)
WBC # BLD AUTO: 63.1 K/UL (ref 4.3–11.1)
WBC MORPH BLD: ABNORMAL

## 2023-09-20 PROCEDURE — 83615 LACTATE (LD) (LDH) ENZYME: CPT

## 2023-09-20 PROCEDURE — 99214 OFFICE O/P EST MOD 30 MIN: CPT | Performed by: INTERNAL MEDICINE

## 2023-09-20 PROCEDURE — 80053 COMPREHEN METABOLIC PANEL: CPT

## 2023-09-20 PROCEDURE — 85025 COMPLETE CBC W/AUTO DIFF WBC: CPT

## 2023-09-20 PROCEDURE — 36415 COLL VENOUS BLD VENIPUNCTURE: CPT

## 2023-09-20 ASSESSMENT — PATIENT HEALTH QUESTIONNAIRE - PHQ9
1. LITTLE INTEREST OR PLEASURE IN DOING THINGS: 0
SUM OF ALL RESPONSES TO PHQ9 QUESTIONS 1 & 2: 0
SUM OF ALL RESPONSES TO PHQ QUESTIONS 1-9: 0
2. FEELING DOWN, DEPRESSED OR HOPELESS: 0

## 2023-09-20 NOTE — PATIENT INSTRUCTIONS
0.8 K/UL Final    Basophils Absolute 09/20/2023 0.0  0.0 - 0.2 K/UL Final    Absolute Immature Granulocyte 09/20/2023 0.0  0.0 - 0.5 K/UL Final    RBC Comment 09/20/2023     Final                    Value:SLIGHT  POLYCHROMASIA      WBC Comment 09/20/2023 Result Confirmed By Smear    Final    Comment: MODERATE  ATYPICAL LYMPHOCYTES PRESENT  MODERATE  SMUDGE CELLS      Platelet Comment 09/60/2885 DECREASED    Final    Differential Type 09/20/2023 AUTOMATED    Final    Sodium 09/20/2023 139  133 - 143 mmol/L Final    Potassium 09/20/2023 4.2  3.5 - 5.1 mmol/L Final    Chloride 09/20/2023 106  101 - 110 mmol/L Final    CO2 09/20/2023 28  21 - 32 mmol/L Final    Anion Gap 09/20/2023 5  2 - 11 mmol/L Final    Glucose 09/20/2023 119 (H)  65 - 100 mg/dL Final    BUN 09/20/2023 20  6 - 23 MG/DL Final    Creatinine 09/20/2023 1.00  0.8 - 1.5 MG/DL Final    Est, Glom Filt Rate 09/20/2023 >60  >60 ml/min/1.73m2 Final    Comment:    Pediatric calculator link: [email or web address]. org/professionals/kdoqi/gfr_calculatorped     These results are not intended for use in patients <25years of age. eGFR results are calculated without a race factor using  the 2021 CKD-EPI equation. Careful clinical correlation is recommended, particularly when comparing to results calculated using previous equations. The CKD-EPI equation is less accurate in patients with extremes of muscle mass, extra-renal metabolism of creatinine, excessive creatine ingestion, or following therapy that affects renal tubular secretion.       Calcium 09/20/2023 9.0  8.3 - 10.4 MG/DL Final    Total Bilirubin 09/20/2023 0.8  0.2 - 1.1 MG/DL Final    ALT 09/20/2023 39  12 - 65 U/L Final    AST 09/20/2023 23  15 - 37 U/L Final    Alk Phosphatase 09/20/2023 73  50 - 136 U/L Final    Total Protein 09/20/2023 6.8  6.3 - 8.2 g/dL Final    Albumin 09/20/2023 4.0  3.5 - 5.0 g/dL Final    Globulin 09/20/2023 2.8  2.8 - 4.5 g/dL Final    Albumin/Globulin Ratio 09/20/2023 1.4

## 2023-09-20 NOTE — PROGRESS NOTES
paticipate in care of this pleasant patient.  Please call w/ any austin Shankar MD  [facility]  Hematology Oncology  [address]  [address] [place] [address]  Office : [phone]  Fax : [phone]

## 2023-10-04 ENCOUNTER — OFFICE VISIT (OUTPATIENT)
Dept: PRIMARY CARE CLINIC | Facility: CLINIC | Age: 58
End: 2023-10-04
Payer: COMMERCIAL

## 2023-10-04 VITALS
BODY MASS INDEX: 37.04 KG/M2 | WEIGHT: 236 LBS | SYSTOLIC BLOOD PRESSURE: 115 MMHG | DIASTOLIC BLOOD PRESSURE: 81 MMHG | HEIGHT: 67 IN | TEMPERATURE: 98 F | OXYGEN SATURATION: 95 % | RESPIRATION RATE: 15 BRPM | HEART RATE: 60 BPM

## 2023-10-04 DIAGNOSIS — J44.1 COPD EXACERBATION (HCC): ICD-10-CM

## 2023-10-04 DIAGNOSIS — J40 BRONCHITIS: Primary | ICD-10-CM

## 2023-10-04 DIAGNOSIS — F33.1 MAJOR DEPRESSIVE DISORDER, RECURRENT, MODERATE (HCC): ICD-10-CM

## 2023-10-04 DIAGNOSIS — C91.10 CHRONIC LYMPHOCYTIC LEUKEMIA (CLL) GENETIC MUTATION VARIANT (HCC): ICD-10-CM

## 2023-10-04 DIAGNOSIS — Z87.891 EX-SMOKER: ICD-10-CM

## 2023-10-04 DIAGNOSIS — Z23 ENCOUNTER FOR IMMUNIZATION: ICD-10-CM

## 2023-10-04 DIAGNOSIS — E66.01 SEVERE OBESITY (BMI 35.0-35.9 WITH COMORBIDITY) (HCC): ICD-10-CM

## 2023-10-04 DIAGNOSIS — E78.5 DYSLIPIDEMIA: ICD-10-CM

## 2023-10-04 DIAGNOSIS — J30.1 SEASONAL ALLERGIC RHINITIS DUE TO POLLEN: ICD-10-CM

## 2023-10-04 DIAGNOSIS — R73.03 PRE-DIABETES: ICD-10-CM

## 2023-10-04 PROBLEM — F33.9 MAJOR DEPRESSIVE DISORDER, RECURRENT, UNSPECIFIED (HCC): Status: RESOLVED | Noted: 2023-09-20 | Resolved: 2023-10-04

## 2023-10-04 PROBLEM — E11.9 TYPE 2 DIABETES MELLITUS (HCC): Status: RESOLVED | Noted: 2023-09-20 | Resolved: 2023-10-04

## 2023-10-04 PROBLEM — F33.0 MAJOR DEPRESSIVE DISORDER, RECURRENT, MILD (HCC): Status: RESOLVED | Noted: 2023-09-20 | Resolved: 2023-10-04

## 2023-10-04 PROCEDURE — 90674 CCIIV4 VAC NO PRSV 0.5 ML IM: CPT | Performed by: FAMILY MEDICINE

## 2023-10-04 PROCEDURE — 90471 IMMUNIZATION ADMIN: CPT | Performed by: FAMILY MEDICINE

## 2023-10-04 PROCEDURE — 99214 OFFICE O/P EST MOD 30 MIN: CPT | Performed by: FAMILY MEDICINE

## 2023-10-04 RX ORDER — AZITHROMYCIN 250 MG/1
250 TABLET, FILM COATED ORAL SEE ADMIN INSTRUCTIONS
Qty: 6 TABLET | Refills: 0 | Status: SHIPPED | OUTPATIENT
Start: 2023-10-04 | End: 2023-10-09

## 2023-10-04 RX ORDER — DEXAMETHASONE 0.5 MG/1
TABLET ORAL
Qty: 30 TABLET | Refills: 0 | Status: SHIPPED | OUTPATIENT
Start: 2023-10-04

## 2023-10-04 RX ORDER — FLUTICASONE PROPIONATE 50 MCG
2 SPRAY, SUSPENSION (ML) NASAL DAILY
Qty: 48 G | Refills: 1 | Status: SHIPPED | OUTPATIENT
Start: 2023-10-04

## 2023-10-04 ASSESSMENT — ENCOUNTER SYMPTOMS
BLOOD IN STOOL: 0
COUGH: 1
CONSTIPATION: 0
RHINORRHEA: 0
BACK PAIN: 0
CHOKING: 0
EYE DISCHARGE: 0
EYE PAIN: 0
VOMITING: 0
SHORTNESS OF BREATH: 0
SINUS PRESSURE: 0
DIARRHEA: 0
SORE THROAT: 0
SINUS PAIN: 0
CHEST TIGHTNESS: 0
ABDOMINAL DISTENTION: 0
EYE REDNESS: 0
WHEEZING: 0
ABDOMINAL PAIN: 0
TROUBLE SWALLOWING: 0
COLOR CHANGE: 0
VOICE CHANGE: 0
PHOTOPHOBIA: 0
NAUSEA: 0

## 2023-10-04 ASSESSMENT — PATIENT HEALTH QUESTIONNAIRE - PHQ9
SUM OF ALL RESPONSES TO PHQ QUESTIONS 1-9: 0
2. FEELING DOWN, DEPRESSED OR HOPELESS: 0
SUM OF ALL RESPONSES TO PHQ QUESTIONS 1-9: 0
1. LITTLE INTEREST OR PLEASURE IN DOING THINGS: 0
SUM OF ALL RESPONSES TO PHQ QUESTIONS 1-9: 0
SUM OF ALL RESPONSES TO PHQ QUESTIONS 1-9: 0
SUM OF ALL RESPONSES TO PHQ9 QUESTIONS 1 & 2: 0

## 2023-10-04 NOTE — PROGRESS NOTES
Here for follow-up for numerous medical problems complains of cough with sputum for 1 week. History smoking. Hyperlipidemia dyslipidemia low HDL prediabetic last hemoglobin A1c 5.6 come down continue metformin diabetic diet. Upcoming hematology appointment for chronic lymphocytic leukemia. Anxiety depression better not taking Cymbalta. He will take flu shot preventative care discussed. He is not diabetic    Review of Systems   Constitutional:  Negative for activity change, appetite change, chills, diaphoresis, fatigue, fever and unexpected weight change. HENT:  Negative for congestion, ear pain, hearing loss, nosebleeds, rhinorrhea, sinus pressure, sinus pain, sore throat, trouble swallowing and voice change. Eyes:  Negative for photophobia, pain, discharge, redness and visual disturbance. Respiratory:  Positive for cough. Negative for choking, chest tightness, shortness of breath and wheezing. Cardiovascular:  Negative for chest pain, palpitations and leg swelling. Gastrointestinal:  Negative for abdominal distention, abdominal pain, blood in stool, constipation, diarrhea, nausea and vomiting. Endocrine: Negative for cold intolerance, heat intolerance, polydipsia, polyphagia and polyuria. Genitourinary:  Negative for difficulty urinating, dysuria, frequency, genital sores, hematuria, penile discharge, penile pain, testicular pain and urgency. Musculoskeletal:  Negative for arthralgias, back pain, gait problem, joint swelling, myalgias and neck pain. Skin:  Negative for color change and rash. Allergic/Immunologic: Negative for environmental allergies and immunocompromised state. Neurological:  Negative for dizziness, tremors, seizures, syncope, speech difficulty, weakness, numbness and headaches. Hematological:  Negative for adenopathy. Does not bruise/bleed easily.    Psychiatric/Behavioral:  Negative for behavioral problems, confusion, decreased concentration, hallucinations,

## 2024-01-05 ENCOUNTER — OFFICE VISIT (OUTPATIENT)
Dept: GASTROENTEROLOGY | Age: 59
End: 2024-01-05
Payer: COMMERCIAL

## 2024-01-05 VITALS
TEMPERATURE: 97.1 F | OXYGEN SATURATION: 96 % | HEIGHT: 67 IN | BODY MASS INDEX: 37.83 KG/M2 | HEART RATE: 75 BPM | WEIGHT: 241 LBS | SYSTOLIC BLOOD PRESSURE: 118 MMHG | RESPIRATION RATE: 16 BRPM | DIASTOLIC BLOOD PRESSURE: 72 MMHG

## 2024-01-05 DIAGNOSIS — K80.20 GALLBLADDER STONE WITHOUT CHOLECYSTITIS OR OBSTRUCTION: ICD-10-CM

## 2024-01-05 DIAGNOSIS — K76.0 FATTY LIVER: Primary | ICD-10-CM

## 2024-01-05 PROCEDURE — 99203 OFFICE O/P NEW LOW 30 MIN: CPT | Performed by: INTERNAL MEDICINE

## 2024-02-23 DIAGNOSIS — C91.10 CHRONIC LYMPHOCYTIC LEUKEMIA (CLL) GENETIC MUTATION VARIANT (HCC): Primary | ICD-10-CM

## 2024-04-11 ENCOUNTER — CLINICAL DOCUMENTATION (OUTPATIENT)
Dept: ONCOLOGY | Age: 59
End: 2024-04-11

## 2024-04-11 ENCOUNTER — OFFICE VISIT (OUTPATIENT)
Dept: ONCOLOGY | Age: 59
End: 2024-04-11
Payer: COMMERCIAL

## 2024-04-11 ENCOUNTER — HOSPITAL ENCOUNTER (OUTPATIENT)
Dept: LAB | Age: 59
Discharge: HOME | End: 2024-04-11
Payer: COMMERCIAL

## 2024-04-11 VITALS
TEMPERATURE: 97.5 F | SYSTOLIC BLOOD PRESSURE: 124 MMHG | HEART RATE: 72 BPM | DIASTOLIC BLOOD PRESSURE: 84 MMHG | OXYGEN SATURATION: 97 % | WEIGHT: 239.7 LBS | BODY MASS INDEX: 37.62 KG/M2 | RESPIRATION RATE: 16 BRPM | HEIGHT: 67 IN

## 2024-04-11 DIAGNOSIS — E66.01 SEVERE OBESITY (BMI 35.0-39.9) WITH COMORBIDITY (HCC): ICD-10-CM

## 2024-04-11 DIAGNOSIS — C91.10 CHRONIC LYMPHOCYTIC LEUKEMIA (CLL) GENETIC MUTATION VARIANT (HCC): Primary | ICD-10-CM

## 2024-04-11 DIAGNOSIS — D72.820 LYMPHOCYTOSIS: ICD-10-CM

## 2024-04-11 DIAGNOSIS — D69.6 THROMBOCYTOPENIA, UNSPECIFIED (HCC): ICD-10-CM

## 2024-04-11 DIAGNOSIS — K76.0 FATTY LIVER: ICD-10-CM

## 2024-04-11 DIAGNOSIS — C91.10 CHRONIC LYMPHOCYTIC LEUKEMIA (CLL) GENETIC MUTATION VARIANT (HCC): ICD-10-CM

## 2024-04-11 LAB
ALBUMIN SERPL-MCNC: 3.9 G/DL (ref 3.5–5)
ALBUMIN/GLOB SERPL: 1.5 (ref 0.4–1.6)
ALP SERPL-CCNC: 77 U/L (ref 50–136)
ALT SERPL-CCNC: 39 U/L (ref 12–65)
ANION GAP SERPL CALC-SCNC: 5 MMOL/L (ref 2–11)
AST SERPL-CCNC: 21 U/L (ref 15–37)
BASOPHILS # BLD: 0 K/UL (ref 0–0.2)
BASOPHILS NFR BLD: 0 % (ref 0–2)
BILIRUB SERPL-MCNC: 0.5 MG/DL (ref 0.2–1.1)
BUN SERPL-MCNC: 12 MG/DL (ref 6–23)
CALCIUM SERPL-MCNC: 9 MG/DL (ref 8.3–10.4)
CHLORIDE SERPL-SCNC: 110 MMOL/L (ref 103–113)
CO2 SERPL-SCNC: 25 MMOL/L (ref 21–32)
CREAT SERPL-MCNC: 1 MG/DL (ref 0.8–1.5)
DIFFERENTIAL METHOD BLD: ABNORMAL
EOSINOPHIL # BLD: 0.7 K/UL (ref 0–0.8)
EOSINOPHIL NFR BLD: 1 % (ref 0.5–7.8)
ERYTHROCYTE [DISTWIDTH] IN BLOOD BY AUTOMATED COUNT: 14.2 % (ref 11.9–14.6)
GLOBULIN SER CALC-MCNC: 2.6 G/DL (ref 2.8–4.5)
GLUCOSE SERPL-MCNC: 105 MG/DL (ref 65–100)
HCT VFR BLD AUTO: 45.1 % (ref 41.1–50.3)
HGB BLD-MCNC: 14.6 G/DL (ref 13.6–17.2)
IMM GRANULOCYTES # BLD AUTO: 0 K/UL (ref 0–0.5)
IMM GRANULOCYTES NFR BLD AUTO: 0 % (ref 0–5)
LDH SERPL L TO P-CCNC: 140 U/L (ref 100–190)
LYMPHOCYTES # BLD: 67.3 K/UL (ref 0.5–4.6)
LYMPHOCYTES NFR BLD: 93 % (ref 13–44)
MCH RBC QN AUTO: 29.9 PG (ref 26.1–32.9)
MCHC RBC AUTO-ENTMCNC: 32.4 G/DL (ref 31.4–35)
MCV RBC AUTO: 92.4 FL (ref 82–102)
MONOCYTES # BLD: 0.7 K/UL (ref 0.1–1.3)
MONOCYTES NFR BLD: 1 % (ref 4–12)
NEUTS SEG # BLD: 3.6 K/UL (ref 1.7–8.2)
NEUTS SEG NFR BLD: 5 % (ref 43–78)
NRBC # BLD: 0 K/UL (ref 0–0.2)
PLATELET # BLD AUTO: 102 K/UL (ref 150–450)
PLATELET COMMENT: ABNORMAL
PMV BLD AUTO: 9.9 FL (ref 9.4–12.3)
POTASSIUM SERPL-SCNC: 4.3 MMOL/L (ref 3.5–5.1)
PROT SERPL-MCNC: 6.5 G/DL (ref 6.3–8.2)
RBC # BLD AUTO: 4.88 M/UL (ref 4.23–5.6)
RBC MORPH BLD: ABNORMAL
SODIUM SERPL-SCNC: 140 MMOL/L (ref 136–146)
WBC # BLD AUTO: 72.3 K/UL (ref 4.3–11.1)
WBC MORPH BLD: ABNORMAL

## 2024-04-11 PROCEDURE — 99214 OFFICE O/P EST MOD 30 MIN: CPT | Performed by: INTERNAL MEDICINE

## 2024-04-11 PROCEDURE — 83615 LACTATE (LD) (LDH) ENZYME: CPT

## 2024-04-11 PROCEDURE — 85025 COMPLETE CBC W/AUTO DIFF WBC: CPT

## 2024-04-11 PROCEDURE — 80053 COMPREHEN METABOLIC PANEL: CPT

## 2024-04-11 PROCEDURE — 36415 COLL VENOUS BLD VENIPUNCTURE: CPT

## 2024-04-11 RX ORDER — VALACYCLOVIR HYDROCHLORIDE 1 G/1
TABLET, FILM COATED ORAL
COMMUNITY
Start: 2024-03-27

## 2024-04-11 ASSESSMENT — PATIENT HEALTH QUESTIONNAIRE - PHQ9
SUM OF ALL RESPONSES TO PHQ QUESTIONS 1-9: 0
2. FEELING DOWN, DEPRESSED OR HOPELESS: NOT AT ALL
1. LITTLE INTEREST OR PLEASURE IN DOING THINGS: NOT AT ALL
SUM OF ALL RESPONSES TO PHQ9 QUESTIONS 1 & 2: 0

## 2024-04-11 NOTE — PATIENT INSTRUCTIONS
Patient Information from Today's Visit    - labs reviewed. No need to start treatment at this time, we will continue to monitor you regularly.    - If you get any of the following symptoms you should call us and we will see you sooner:   -drenching night sweats   -unexplained fevers   -unexplained weight loss   -new enlarged lymph nodes     Treatment Summary has been discussed and given to patient:No  Follow Up: in 4 months     Please refer to After Visit Summary or [facility] for upcoming appointment information. If you have any questions regarding your upcoming schedule please reach out to your care team through [facility] or call [phone].          -------------------------------------------------------------------------------------------------------------------  Please call our office at [phone] if you have any  of the following symptoms:   Fever of 100.5 or greater  Chills  Shortness of breath  Swelling or pain in one leg    After office hours an answering service is available and will contact a provider for emergencies or if you are experiencing any of the above symptoms.    Patient did express an interest in My Chart.  My Chart log in information explained on the after visit summary printout at the check-out desk.    Gayathri Friend RN     Hospital Outpatient Visit on 04/11/2024   Component Date Value Ref Range Status    LD 04/11/2024 140  100 - 190 U/L Final    Sodium 04/11/2024 140  136 - 146 mmol/L Final    Potassium 04/11/2024 4.3  3.5 - 5.1 mmol/L Final    Chloride 04/11/2024 110  103 - 113 mmol/L Final    CO2 04/11/2024 25  21 - 32 mmol/L Final    Anion Gap 04/11/2024 5  2 - 11 mmol/L Final    Glucose 04/11/2024 105 (H)  65 - 100 mg/dL Final    BUN 04/11/2024 12  6 - 23 MG/DL Final    Creatinine 04/11/2024 1.00  0.8 - 1.5 MG/DL Final    Est, Glom Filt Rate 04/11/2024 87  >60 ml/min/1.73m2 Final    Comment:    Pediatric calculator link:

## 2024-04-11 NOTE — PROGRESS NOTES
Father     Cancer Father     Cancer Sister     Hypertension Sister     Heart Disease Brother        No Known Allergies    PHYSICAL EXAMINATION:  General Appearance: Healthy appearing patient in no acute distress  Vitals reviewed.   /84   Pulse 72   Temp 97.5 °F (36.4 °C) (Oral)   Resp 16   Ht 1.702 m (5' 7\")   Wt 108.7 kg (239 lb 11.2 oz)   SpO2 97%   BMI 37.54 kg/m²   HEENT: No oral or pharyngeal masses, ulceration or thrush noted, no sinus tenderness.  Neck is supple with no thyromegaly or JVD noted.  Lymph Nodes: No lymphadenopathy noted in the occipital, pre and post auricular, cervical, supra and infraclavicular, axillary, epitrochlear, inguinal, and popliteal region.  Breasts: No palpable masses, nipple discharge or skin retraction  Lungs/Thorax: Clear to auscultation, no accessory muscles of respiration being used.  Heart: Regular rate and rhythm, normal S1, S2, no appreciable murmurs, rubs, gallops  Abdomen: Soft, nontender, bowel sounds present, no appreciable hepatosplenomegaly, no palpable masses  Extremeties: Good pulses bilaterally, no peripheral edema.  Skin: Normal skin tone with no rash, petechiae, ecchymosis noted.  Musculoskeletal: No pain on palpation over bony prominence, no edema, no evidence of gout, no joint or bony deformity  Neurologic: Grossly intact        LABS/IMAGING:    Lab Results   Component Value Date/Time    WBC 72.3 04/11/2024 08:40 AM    HGB 14.6 04/11/2024 08:40 AM    HCT 45.1 04/11/2024 08:40 AM     04/11/2024 08:40 AM    MCV 92.4 04/11/2024 08:40 AM       Lab Results   Component Value Date/Time     04/11/2024 08:40 AM    K 4.3 04/11/2024 08:40 AM     04/11/2024 08:40 AM    CO2 25 04/11/2024 08:40 AM    BUN 12 04/11/2024 08:40 AM    GFRAA >60 09/22/2020 11:36 AM    GLOB 2.6 04/11/2024 08:40 AM    ALT 39 04/11/2024 08:40 AM             Above results reviewed with patient.    ASSESSMENT:  58-year-old male, history of cholelithiasis, abdominal wall

## 2024-04-11 NOTE — PROGRESS NOTES
Critical lab of WBC-72.3 received from laboratory, read back and verified. Provider and Clinical support staff notified of critical lab value needing to be addressed at today's visit and receipt confirmed.

## 2024-08-29 ENCOUNTER — OFFICE VISIT (OUTPATIENT)
Dept: ONCOLOGY | Age: 59
End: 2024-08-29
Payer: COMMERCIAL

## 2024-08-29 ENCOUNTER — HOSPITAL ENCOUNTER (OUTPATIENT)
Dept: LAB | Age: 59
Discharge: HOME | End: 2024-08-29
Payer: COMMERCIAL

## 2024-08-29 ENCOUNTER — CLINICAL DOCUMENTATION (OUTPATIENT)
Dept: ONCOLOGY | Age: 59
End: 2024-08-29

## 2024-08-29 VITALS
HEIGHT: 67 IN | WEIGHT: 244.2 LBS | BODY MASS INDEX: 38.33 KG/M2 | DIASTOLIC BLOOD PRESSURE: 76 MMHG | SYSTOLIC BLOOD PRESSURE: 116 MMHG | TEMPERATURE: 97.5 F | RESPIRATION RATE: 16 BRPM | OXYGEN SATURATION: 97 % | HEART RATE: 69 BPM

## 2024-08-29 DIAGNOSIS — C91.10 CHRONIC LYMPHOCYTIC LEUKEMIA (CLL) GENETIC MUTATION VARIANT (HCC): Primary | ICD-10-CM

## 2024-08-29 DIAGNOSIS — D69.6 THROMBOCYTOPENIA, UNSPECIFIED (HCC): ICD-10-CM

## 2024-08-29 DIAGNOSIS — C91.10 CHRONIC LYMPHOCYTIC LEUKEMIA (CLL) GENETIC MUTATION VARIANT (HCC): ICD-10-CM

## 2024-08-29 LAB
ALBUMIN SERPL-MCNC: 4.2 G/DL (ref 3.5–5)
ALBUMIN/GLOB SERPL: 1.8 (ref 1–1.9)
ALP SERPL-CCNC: 74 U/L (ref 40–129)
ALT SERPL-CCNC: 32 U/L (ref 12–65)
ANION GAP SERPL CALC-SCNC: 11 MMOL/L (ref 9–18)
AST SERPL-CCNC: 26 U/L (ref 15–37)
BASOPHILS # BLD: 0 K/UL (ref 0–0.2)
BASOPHILS NFR BLD: 0 % (ref 0–2)
BILIRUB SERPL-MCNC: 0.5 MG/DL (ref 0–1.2)
BUN SERPL-MCNC: 21 MG/DL (ref 6–23)
CALCIUM SERPL-MCNC: 9.5 MG/DL (ref 8.8–10.2)
CHLORIDE SERPL-SCNC: 104 MMOL/L (ref 98–107)
CO2 SERPL-SCNC: 24 MMOL/L (ref 20–28)
CREAT SERPL-MCNC: 0.98 MG/DL (ref 0.8–1.3)
DIFFERENTIAL METHOD BLD: ABNORMAL
EOSINOPHIL # BLD: 0 K/UL (ref 0–0.8)
EOSINOPHIL NFR BLD: 0 % (ref 0.5–7.8)
ERYTHROCYTE [DISTWIDTH] IN BLOOD BY AUTOMATED COUNT: 13.7 % (ref 11.9–14.6)
GLOBULIN SER CALC-MCNC: 2.3 G/DL (ref 2.3–3.5)
GLUCOSE SERPL-MCNC: 97 MG/DL (ref 70–99)
HCT VFR BLD AUTO: 44 % (ref 41.1–50.3)
HGB BLD-MCNC: 14.1 G/DL (ref 13.6–17.2)
IMM GRANULOCYTES # BLD AUTO: 0 K/UL (ref 0–0.5)
IMM GRANULOCYTES NFR BLD AUTO: 0 % (ref 0–5)
LDH SERPL L TO P-CCNC: 164 U/L (ref 127–281)
LYMPHOCYTES # BLD: 62.8 K/UL (ref 0.5–4.6)
LYMPHOCYTES NFR BLD: 93 % (ref 13–44)
MCH RBC QN AUTO: 29.7 PG (ref 26.1–32.9)
MCHC RBC AUTO-ENTMCNC: 32 G/DL (ref 31.4–35)
MCV RBC AUTO: 92.6 FL (ref 82–102)
MONOCYTES # BLD: 0.7 K/UL (ref 0.1–1.3)
MONOCYTES NFR BLD: 1 % (ref 4–12)
NEUTS SEG # BLD: 4.1 K/UL (ref 1.7–8.2)
NEUTS SEG NFR BLD: 6 % (ref 43–78)
NRBC # BLD: 0 K/UL (ref 0–0.2)
PLATELET # BLD AUTO: 98 K/UL (ref 150–450)
PLATELET COMMENT: ABNORMAL
PMV BLD AUTO: 10.2 FL (ref 9.4–12.3)
POTASSIUM SERPL-SCNC: 4.2 MMOL/L (ref 3.5–5.1)
PROT SERPL-MCNC: 6.5 G/DL (ref 6.3–8.2)
RBC # BLD AUTO: 4.75 M/UL (ref 4.23–5.6)
RBC MORPH BLD: ABNORMAL
SODIUM SERPL-SCNC: 139 MMOL/L (ref 136–145)
WBC # BLD AUTO: 67.6 K/UL (ref 4.3–11.1)
WBC MORPH BLD: ABNORMAL

## 2024-08-29 PROCEDURE — 85025 COMPLETE CBC W/AUTO DIFF WBC: CPT

## 2024-08-29 PROCEDURE — 83615 LACTATE (LD) (LDH) ENZYME: CPT

## 2024-08-29 PROCEDURE — 36415 COLL VENOUS BLD VENIPUNCTURE: CPT

## 2024-08-29 PROCEDURE — 80053 COMPREHEN METABOLIC PANEL: CPT

## 2024-08-29 PROCEDURE — 99214 OFFICE O/P EST MOD 30 MIN: CPT | Performed by: NURSE PRACTITIONER

## 2024-08-29 ASSESSMENT — PATIENT HEALTH QUESTIONNAIRE - PHQ9
SUM OF ALL RESPONSES TO PHQ QUESTIONS 1-9: 2
SUM OF ALL RESPONSES TO PHQ QUESTIONS 1-9: 2
1. LITTLE INTEREST OR PLEASURE IN DOING THINGS: SEVERAL DAYS
2. FEELING DOWN, DEPRESSED OR HOPELESS: SEVERAL DAYS
SUM OF ALL RESPONSES TO PHQ QUESTIONS 1-9: 2
SUM OF ALL RESPONSES TO PHQ9 QUESTIONS 1 & 2: 2
SUM OF ALL RESPONSES TO PHQ QUESTIONS 1-9: 2

## 2024-08-29 NOTE — PROGRESS NOTES
Critical lab of WBC=67.6 received from laboratory, read back and verified. Provider and Clinical support staff notified of critical lab value needing to be addressed at today's visit and receipt confirmed.

## 2024-08-29 NOTE — PROGRESS NOTES
[facility]    8/29/2024    9:03 AM    Shawnee Todd [id number]    59 y.o.      Patient Encounter: [facility] Visit    Heme Diagnosis:  CLL  Heme History (Copied from prior):   58-year-old male, history of cholelithiasis, abdominal wall hernia repair, stage II CLL (with leukocytosis, adenopathy and splenomegaly, bone marrow biopsy 6/18/2013 showing hypercellular marrow with CLL: 13q deletion, hyper-mutated IgVH), previously seen and followed with Dr. Meadows who has now retired.  From July 2013 till June 2019 his ALC (absolute lymphocyte count) parish from 16-60 K.  More recently had mild COVID 8/21 with reinfection in 8/22 for which she took Paxil with.  Recently seen by PCP Dr. Villegas, with WBC mostly stable at 69,000.  Now referred to us to reestablish care with hematology.  Reports feeling \"great\".  Denies any B symptoms, new lumps or bumps.  Reports on last visit with Dr. Meadows, he was advised continued monitoring.    Interval History:  8/29/2024: Patient seen for his history of CLL, splenomegaly, and ongoing monitoring. He has been well since last seen. He has no new issues or complaints to voice. He has fair energy - words a lot. Appetite is great, no weight loss noted. No lymphadenopathy, no fevers, no night sweats. No new pain. Labs reviewed and adequate. All cell lines stable - WBC 67.6, Hgb 14.1, platelets 98,000. CMP excllent. Plan to follow up in 4 months or sooner if needed.     REVIEW OF SYSTEMS:  As mentioned above, all other systems were reviewed in full and are negative.    Past Medical History:   Diagnosis Date    Adiposity     Anemia     Asthma, cough variant     Cancer (HCC)     leukemia    Hypercholesterolemia     Major depressive disorder, recurrent, mild 9/20/2023    Obesity        Past Surgical History:   Procedure Laterality Date    COLONOSCOPY      HERNIA REPAIR Right 2015    5 removed    UPPER GASTROINTESTINAL ENDOSCOPY      K 4.3 04/11/2024 08:40 AM     04/11/2024 08:40 AM    CO2 25 04/11/2024 08:40 AM    BUN 12 04/11/2024 08:40 AM    GFRAA >60 09/22/2020 11:36 AM    GLOB 2.6 04/11/2024 08:40 AM    ALT 39 04/11/2024 08:40 AM             Above results reviewed with patient.    ASSESSMENT:  58-year-old male, history of cholelithiasis, abdominal wall hernia repair, stage II CLL (with leukocytosis, adenopathy and splenomegaly, bone marrow biopsy 6/18/2013 showing hypercellular marrow with CLL: 13q deletion, hyper-mutated IgVH), previously seen and followed with Dr. Meadows who has now retired.  From July 2013 till June 2019 his ALC (absolute lymphocyte count) parish from 16-60 K.  More recently had mild COVID 8/21 with reinfection in 8/22 for which he took Paxil with.  Recently seen by PCP Dr. Villegas, with WBC mostly stable at 69,000.  Now referred to us to reestablish care with hematology.  Reports feeling \"great\".  Denies any B symptoms, new lumps or bumps.  Reports on last visit with Dr. Meadows, he was advised continued monitoring.    In summary, middle-aged gentleman with longstanding history of CLL, stage II, without high risk features, monitored since 2013.  No clinical evidence of progression or endorgan damage, and as such, continue monitoring    9/20/2023: Patient reports doing reasonably.  Denies any B symptoms, new lumps or bumps.  Exam without adenopathy.  Peripheral blood flow cytometry consistent with CLL, CD38 negative conferring favorable prognosis.  Further peripheral blood prognostic evaluation pending.  Abdominal ultrasound with hepatic steatosis and splenomegaly at 17 cm, likely CLL related however will refer to GI as well.  We will see him back in 3 months.    4/11/2024: Patient seen for his history of CLL, splenomegaly, and ongoing monitoring.  Since last visit, he has continued to do well and denies any B symptoms.  He did see a GI Dr Treadwell and given LFTs, it was felt his splenomegaly was most related

## 2024-12-05 ENCOUNTER — OFFICE VISIT (OUTPATIENT)
Dept: ONCOLOGY | Age: 59
End: 2024-12-05
Payer: COMMERCIAL

## 2024-12-05 ENCOUNTER — HOSPITAL ENCOUNTER (OUTPATIENT)
Dept: LAB | Age: 59
Discharge: HOME | End: 2024-12-05
Payer: COMMERCIAL

## 2024-12-05 ENCOUNTER — CLINICAL DOCUMENTATION (OUTPATIENT)
Dept: ONCOLOGY | Age: 59
End: 2024-12-05

## 2024-12-05 VITALS
OXYGEN SATURATION: 97 % | WEIGHT: 246.1 LBS | HEART RATE: 69 BPM | BODY MASS INDEX: 38.63 KG/M2 | TEMPERATURE: 97.4 F | DIASTOLIC BLOOD PRESSURE: 79 MMHG | HEIGHT: 67 IN | SYSTOLIC BLOOD PRESSURE: 112 MMHG | RESPIRATION RATE: 16 BRPM

## 2024-12-05 DIAGNOSIS — C91.10 CHRONIC LYMPHOCYTIC LEUKEMIA (CLL) GENETIC MUTATION VARIANT (HCC): Primary | ICD-10-CM

## 2024-12-05 DIAGNOSIS — C91.10 CHRONIC LYMPHOCYTIC LEUKEMIA (CLL) GENETIC MUTATION VARIANT (HCC): ICD-10-CM

## 2024-12-05 LAB
ALBUMIN SERPL-MCNC: 4.2 G/DL (ref 3.5–5)
ALBUMIN/GLOB SERPL: 1.6 (ref 1–1.9)
ALP SERPL-CCNC: 72 U/L (ref 40–129)
ALT SERPL-CCNC: 31 U/L (ref 8–55)
ANION GAP SERPL CALC-SCNC: 11 MMOL/L (ref 7–16)
AST SERPL-CCNC: 24 U/L (ref 15–37)
BASOPHILS # BLD: 0 K/UL (ref 0–0.2)
BASOPHILS NFR BLD: 0 % (ref 0–2)
BILIRUB SERPL-MCNC: 0.6 MG/DL (ref 0–1.2)
BUN SERPL-MCNC: 19 MG/DL (ref 6–23)
CALCIUM SERPL-MCNC: 9.3 MG/DL (ref 8.8–10.2)
CHLORIDE SERPL-SCNC: 102 MMOL/L (ref 98–107)
CO2 SERPL-SCNC: 26 MMOL/L (ref 20–29)
CREAT SERPL-MCNC: 1.07 MG/DL (ref 0.8–1.3)
DIFFERENTIAL METHOD BLD: ABNORMAL
EOSINOPHIL # BLD: 0.7 K/UL (ref 0–0.8)
EOSINOPHIL NFR BLD: 1 % (ref 0.5–7.8)
ERYTHROCYTE [DISTWIDTH] IN BLOOD BY AUTOMATED COUNT: 13.8 % (ref 11.9–14.6)
GLOBULIN SER CALC-MCNC: 2.7 G/DL (ref 2.3–3.5)
GLUCOSE SERPL-MCNC: 96 MG/DL (ref 70–99)
HCT VFR BLD AUTO: 46 % (ref 41.1–50.3)
HGB BLD-MCNC: 14.8 G/DL (ref 13.6–17.2)
IMM GRANULOCYTES # BLD AUTO: 0 K/UL (ref 0–0.5)
IMM GRANULOCYTES NFR BLD AUTO: 0 % (ref 0–5)
LDH SERPL L TO P-CCNC: 164 U/L (ref 127–281)
LYMPHOCYTES # BLD: 65.5 K/UL (ref 0.5–4.6)
LYMPHOCYTES NFR BLD: 92 % (ref 13–44)
MCH RBC QN AUTO: 29.7 PG (ref 26.1–32.9)
MCHC RBC AUTO-ENTMCNC: 32.2 G/DL (ref 31.4–35)
MCV RBC AUTO: 92.4 FL (ref 82–102)
MONOCYTES # BLD: 0.7 K/UL (ref 0.1–1.3)
MONOCYTES NFR BLD: 1 % (ref 4–12)
NEUTS SEG # BLD: 4.3 K/UL (ref 1.7–8.2)
NEUTS SEG NFR BLD: 6 % (ref 43–78)
NRBC # BLD: 0 K/UL (ref 0–0.2)
PLATELET # BLD AUTO: 99 K/UL (ref 150–450)
PLATELET COMMENT: ABNORMAL
PMV BLD AUTO: 9.5 FL (ref 9.4–12.3)
POTASSIUM SERPL-SCNC: 4.5 MMOL/L (ref 3.5–5.1)
PROT SERPL-MCNC: 6.8 G/DL (ref 6.3–8.2)
RBC # BLD AUTO: 4.98 M/UL (ref 4.23–5.6)
RBC MORPH BLD: ABNORMAL
SODIUM SERPL-SCNC: 139 MMOL/L (ref 136–145)
WBC # BLD AUTO: 71.2 K/UL (ref 4.3–11.1)
WBC MORPH BLD: ABNORMAL

## 2024-12-05 PROCEDURE — 85025 COMPLETE CBC W/AUTO DIFF WBC: CPT

## 2024-12-05 PROCEDURE — 99214 OFFICE O/P EST MOD 30 MIN: CPT | Performed by: INTERNAL MEDICINE

## 2024-12-05 PROCEDURE — 36415 COLL VENOUS BLD VENIPUNCTURE: CPT

## 2024-12-05 PROCEDURE — 83615 LACTATE (LD) (LDH) ENZYME: CPT

## 2024-12-05 PROCEDURE — 80053 COMPREHEN METABOLIC PANEL: CPT

## 2024-12-05 ASSESSMENT — PATIENT HEALTH QUESTIONNAIRE - PHQ9
SUM OF ALL RESPONSES TO PHQ QUESTIONS 1-9: 0
SUM OF ALL RESPONSES TO PHQ QUESTIONS 1-9: 0
1. LITTLE INTEREST OR PLEASURE IN DOING THINGS: NOT AT ALL
SUM OF ALL RESPONSES TO PHQ QUESTIONS 1-9: 0
SUM OF ALL RESPONSES TO PHQ9 QUESTIONS 1 & 2: 0
2. FEELING DOWN, DEPRESSED OR HOPELESS: NOT AT ALL
SUM OF ALL RESPONSES TO PHQ QUESTIONS 1-9: 0

## 2024-12-05 NOTE — PROGRESS NOTES
Critical lab of WBC=71.2 received from laboratory, read back and verified. Provider and Clinical support staff notified of critical lab value needing to be addressed at today's visit and receipt confirmed.

## 2024-12-05 NOTE — PROGRESS NOTES
[facility].    12/5/2024    9:25 AM    Shawnee Todd [id number]    59 y.o.      Patient Encounter: [facility] Visit    Heme Diagnosis:  CLL  Heme History (Copied from prior):   58-year-old male, history of cholelithiasis, abdominal wall hernia repair, stage II CLL (with leukocytosis, adenopathy and splenomegaly, bone marrow biopsy 6/18/2013 showing hypercellular marrow with CLL: 13q deletion, hyper-mutated IgVH), previously seen and followed with Dr. Meadows who has now retired.  From July 2013 till June 2019 his ALC (absolute lymphocyte count) parish from 16-60 K.  More recently had mild COVID 8/21 with reinfection in 8/22 for which she took Paxil with.  Recently seen by PCP Dr. Villegas, with WBC mostly stable at 69,000.  Now referred to us to reestablish care with hematology.  Reports feeling \"great\".  Denies any B symptoms, new lumps or bumps.  Reports on last visit with Dr. Meadows, he was advised continued monitoring.  Interval History:  12/5/2024: Patient seen for his history of CLL, likely associated splenomegaly and ongoing monitoring.  Denies any B symptoms, new lumps or bumps.  Blood work today with stable leukocytosis at 71,000, no reciprocal significant cytopenias with platelet count stable at 99,000.  Patient reassured.  Continue simple monitoring, follow-up in 4 months with NP and in 8 months with MD with labs, abdominal imaging/US  REVIEW OF SYSTEMS:  As mentioned above, all other systems were reviewed in full and are negative.    Past Medical History:   Diagnosis Date    Adiposity     Anemia     Asthma, cough variant     Cancer (HCC)     leukemia    Hypercholesterolemia     Major depressive disorder, recurrent, mild 9/20/2023    Obesity        Past Surgical History:   Procedure Laterality Date    COLONOSCOPY      HERNIA REPAIR Right 2015    5 removed    UPPER GASTROINTESTINAL ENDOSCOPY         Current Outpatient Medications

## 2024-12-05 NOTE — PATIENT INSTRUCTIONS
Protein 12/05/2024 6.8  6.3 - 8.2 g/dL Final    Albumin 12/05/2024 4.2  3.5 - 5.0 g/dL Final    Globulin 12/05/2024 2.7  2.3 - 3.5 g/dL Final    Albumin/Globulin Ratio 12/05/2024 1.6  1.0 - 1.9   Final    LD 12/05/2024 164  127 - 281 U/L Final                Please refer to After Visit Summary or [employer] for upcoming appointment information. Please call our office for rescheduling needs at least 24 hours before your scheduled appointment time.If you have any questions regarding your upcoming schedule please reach out to your care team through [employer] or call [phone].     Please notify your assigned Nurse Navigator of any unplanned hospital admissions or Emergency Room visits within 24 hours of discharge.    -------------------------------------------------------------------------------------------------------------------  Please call our office at [phone] if you have any  of the following symptoms:   Fever of 100.5 or greater  Chills  Shortness of breath  Swelling or pain in one leg    After office hours an answering service is available and will contact a provider for emergencies or if you are experiencing any of the above symptoms.        Gayathri Friend RN

## 2025-04-02 ENCOUNTER — CLINICAL DOCUMENTATION (OUTPATIENT)
Dept: ONCOLOGY | Age: 60
End: 2025-04-02

## 2025-04-02 ENCOUNTER — HOSPITAL ENCOUNTER (OUTPATIENT)
Dept: LAB | Age: 60
Discharge: HOME | End: 2025-04-02
Payer: COMMERCIAL

## 2025-04-02 DIAGNOSIS — C91.10 CHRONIC LYMPHOCYTIC LEUKEMIA (CLL) GENETIC MUTATION VARIANT (HCC): ICD-10-CM

## 2025-04-02 LAB
ALBUMIN SERPL-MCNC: 3.9 G/DL (ref 3.5–5)
ALBUMIN/GLOB SERPL: 1.5 (ref 1–1.9)
ALP SERPL-CCNC: 68 U/L (ref 40–129)
ALT SERPL-CCNC: 35 U/L (ref 8–55)
ANION GAP SERPL CALC-SCNC: 11 MMOL/L (ref 7–16)
AST SERPL-CCNC: 24 U/L (ref 15–37)
BASOPHILS # BLD: 0.24 K/UL (ref 0–0.2)
BASOPHILS NFR BLD: 0.4 % (ref 0–2)
BILIRUB SERPL-MCNC: 0.6 MG/DL (ref 0–1.2)
BUN SERPL-MCNC: 13 MG/DL (ref 6–23)
CALCIUM SERPL-MCNC: 9.5 MG/DL (ref 8.8–10.2)
CHLORIDE SERPL-SCNC: 104 MMOL/L (ref 98–107)
CO2 SERPL-SCNC: 25 MMOL/L (ref 20–29)
CREAT SERPL-MCNC: 1.01 MG/DL (ref 0.8–1.3)
DIFFERENTIAL METHOD BLD: ABNORMAL
EOSINOPHIL # BLD: 0.36 K/UL (ref 0–0.8)
EOSINOPHIL NFR BLD: 0.6 % (ref 0.5–7.8)
ERYTHROCYTE [DISTWIDTH] IN BLOOD BY AUTOMATED COUNT: 13.8 % (ref 11.9–14.6)
GLOBULIN SER CALC-MCNC: 2.6 G/DL (ref 2.3–3.5)
GLUCOSE SERPL-MCNC: 98 MG/DL (ref 70–99)
HCT VFR BLD AUTO: 44.9 % (ref 41.1–50.3)
HGB BLD-MCNC: 14.7 G/DL (ref 13.6–17.2)
IMM GRANULOCYTES # BLD AUTO: 0.12 K/UL (ref 0–0.5)
IMM GRANULOCYTES NFR BLD AUTO: 0.2 % (ref 0–5)
LDH SERPL L TO P-CCNC: 137 U/L (ref 127–281)
LYMPHOCYTES # BLD: 55.19 K/UL (ref 0.5–4.6)
LYMPHOCYTES NFR BLD: 92.3 % (ref 13–44)
MCH RBC QN AUTO: 30.1 PG (ref 26.1–32.9)
MCHC RBC AUTO-ENTMCNC: 32.7 G/DL (ref 31.4–35)
MCV RBC AUTO: 92 FL (ref 82–102)
MONOCYTES # BLD: 0.42 K/UL (ref 0.1–1.3)
MONOCYTES NFR BLD: 0.7 % (ref 4–12)
NEUTS SEG # BLD: 3.47 K/UL (ref 1.7–8.2)
NEUTS SEG NFR BLD: 5.8 % (ref 43–78)
NRBC # BLD: 0 K/UL (ref 0–0.2)
PLATELET # BLD AUTO: 91 K/UL (ref 150–450)
PLATELET COMMENT: ABNORMAL
PMV BLD AUTO: 9.8 FL (ref 9.4–12.3)
POTASSIUM SERPL-SCNC: 4.3 MMOL/L (ref 3.5–5.1)
PROT SERPL-MCNC: 6.5 G/DL (ref 6.3–8.2)
RBC # BLD AUTO: 4.88 M/UL (ref 4.23–5.6)
RBC MORPH BLD: ABNORMAL
SODIUM SERPL-SCNC: 140 MMOL/L (ref 136–145)
WBC # BLD AUTO: 59.8 K/UL (ref 4.3–11.1)
WBC MORPH BLD: ABNORMAL

## 2025-04-02 PROCEDURE — 36415 COLL VENOUS BLD VENIPUNCTURE: CPT

## 2025-04-02 PROCEDURE — 85025 COMPLETE CBC W/AUTO DIFF WBC: CPT

## 2025-04-02 PROCEDURE — 83615 LACTATE (LD) (LDH) ENZYME: CPT

## 2025-04-02 PROCEDURE — 80053 COMPREHEN METABOLIC PANEL: CPT

## 2025-04-02 NOTE — PROGRESS NOTES
Critical lab: WBC=59.8  Time: 1537  Read back and Verified(Lab Staff Name): Molly MARTINEZ Provider Notified: Dr. Cadet

## 2025-04-04 ENCOUNTER — OFFICE VISIT (OUTPATIENT)
Dept: ONCOLOGY | Age: 60
End: 2025-04-04
Payer: COMMERCIAL

## 2025-04-04 VITALS
WEIGHT: 238 LBS | RESPIRATION RATE: 18 BRPM | TEMPERATURE: 97.5 F | OXYGEN SATURATION: 93 % | BODY MASS INDEX: 37.35 KG/M2 | HEART RATE: 72 BPM | SYSTOLIC BLOOD PRESSURE: 109 MMHG | HEIGHT: 67 IN | DIASTOLIC BLOOD PRESSURE: 63 MMHG

## 2025-04-04 DIAGNOSIS — D69.6 THROMBOCYTOPENIA, UNSPECIFIED: ICD-10-CM

## 2025-04-04 DIAGNOSIS — C91.10 CHRONIC LYMPHOCYTIC LEUKEMIA (CLL) GENETIC MUTATION VARIANT (HCC): Primary | ICD-10-CM

## 2025-04-04 PROCEDURE — 99214 OFFICE O/P EST MOD 30 MIN: CPT | Performed by: NURSE PRACTITIONER

## 2025-04-04 NOTE — PATIENT INSTRUCTIONS
AUTOMATED    Final    Sodium 04/02/2025 140  136 - 145 mmol/L Final    Potassium 04/02/2025 4.3  3.5 - 5.1 mmol/L Final    Chloride 04/02/2025 104  98 - 107 mmol/L Final    CO2 04/02/2025 25  20 - 29 mmol/L Final    Anion Gap 04/02/2025 11  7 - 16 mmol/L Final    Glucose 04/02/2025 98  70 - 99 mg/dL Final    Comment: <70 mg/dL Consistent with, but not fully diagnostic of hypoglycemia.  100 - 125 mg/dL Impaired fasting glucose/consistent with pre-diabetes mellitus.  > 126 mg/dl Fasting glucose consistent with overt diabetes mellitus      BUN 04/02/2025 13  6 - 23 MG/DL Final    Creatinine 04/02/2025 1.01  0.80 - 1.30 MG/DL Final    Est, Glom Filt Rate 04/02/2025 86  >60 ml/min/1.73m2 Final    Comment:    Pediatric calculator link: https://www.kidney.org/professionals/kdoqi/gfr_calculatorped     These results are not intended for use in patients <18 years of age.     eGFR results are calculated without a race factor using  the 2021 CKD-EPI equation. Careful clinical correlation is recommended, particularly when comparing to results calculated using previous equations.  The CKD-EPI equation is less accurate in patients with extremes of muscle mass, extra-renal metabolism of creatinine, excessive creatine ingestion, or following therapy that affects renal tubular secretion.      Calcium 04/02/2025 9.5  8.8 - 10.2 MG/DL Final    Total Bilirubin 04/02/2025 0.6  0.0 - 1.2 MG/DL Final    ALT 04/02/2025 35  8 - 55 U/L Final    AST 04/02/2025 24  15 - 37 U/L Final    Alk Phosphatase 04/02/2025 68  40 - 129 U/L Final    Total Protein 04/02/2025 6.5  6.3 - 8.2 g/dL Final    Albumin 04/02/2025 3.9  3.5 - 5.0 g/dL Final    Globulin 04/02/2025 2.6  2.3 - 3.5 g/dL Final    Albumin/Globulin Ratio 04/02/2025 1.5  1.0 - 1.9   Final    LD 04/02/2025 137  127 - 281 U/L Final

## 2025-04-04 NOTE — PROGRESS NOTES
[facility]    4/4/2025    8:52 AM    Shawnee Todd [id number]    59 y.o.      Patient Encounter: [facility] Visit    Heme Diagnosis:  CLL  Heme History (Copied from prior):   58-year-old male, history of cholelithiasis, abdominal wall hernia repair, stage II CLL (with leukocytosis, adenopathy and splenomegaly, bone marrow biopsy 6/18/2013 showing hypercellular marrow with CLL: 13q deletion, hyper-mutated IgVH), previously seen and followed with Dr. Meadows who has now retired.  From July 2013 till June 2019 his ALC (absolute lymphocyte count) parish from 16-60 K.  More recently had mild COVID 8/21 with reinfection in 8/22 for which she took Paxil with.  Recently seen by PCP Dr. Villegas, with WBC mostly stable at 69,000.  Now referred to us to reestablish care with hematology.  Reports feeling \"great\".  Denies any B symptoms, new lumps or bumps.  Reports on last visit with Dr. Meadows, he was advised continued monitoring.    Interval History:  4/04/2025: Patient seen for his history of CLL, splenomegaly, and ongoing monitoring. He continues to do well overall. He has no new complaints. Appetite is good - we has lost some weight intentionally. Energy is fair. He has no new lymphadenopathy, no fevers, no night sweats. No new pain. Labs reviewed and adequate. All cell lines stable - WBC 59.8, Hgb 14.7, platelets 91,000. CMP excllent. Plan to follow up in 4 months or sooner if needed with abdominal ultrasound prior.     REVIEW OF SYSTEMS:  As mentioned above, all other systems were reviewed in full and are negative.    Past Medical History:   Diagnosis Date    Adiposity     Anemia     Asthma, cough variant     Cancer (HCC)     leukemia    Hypercholesterolemia     Major depressive disorder, recurrent, mild 9/20/2023    Obesity        Past Surgical History:   Procedure Laterality Date    COLONOSCOPY      HERNIA REPAIR Right 2015    5 removed    UPPER

## 2025-08-07 ENCOUNTER — CLINICAL DOCUMENTATION (OUTPATIENT)
Dept: ONCOLOGY | Age: 60
End: 2025-08-07

## 2025-08-07 ENCOUNTER — HOSPITAL ENCOUNTER (OUTPATIENT)
Dept: LAB | Age: 60
Discharge: HOME | End: 2025-08-07
Payer: COMMERCIAL

## 2025-08-07 DIAGNOSIS — D69.6 THROMBOCYTOPENIA, UNSPECIFIED: ICD-10-CM

## 2025-08-07 DIAGNOSIS — C91.10 CHRONIC LYMPHOCYTIC LEUKEMIA (CLL) GENETIC MUTATION VARIANT (HCC): ICD-10-CM

## 2025-08-07 LAB
ALBUMIN SERPL-MCNC: 4.1 G/DL (ref 3.2–4.6)
ALBUMIN/GLOB SERPL: 1.6 (ref 1–1.9)
ALP SERPL-CCNC: 71 U/L (ref 40–129)
ALT SERPL-CCNC: 24 U/L (ref 8–55)
ANION GAP SERPL CALC-SCNC: 11 MMOL/L (ref 7–16)
AST SERPL-CCNC: 25 U/L (ref 15–37)
BASOPHILS # BLD: 0.25 K/UL (ref 0–0.2)
BASOPHILS NFR BLD: 0.4 % (ref 0–2)
BILIRUB SERPL-MCNC: 0.6 MG/DL (ref 0–1.2)
BUN SERPL-MCNC: 23 MG/DL (ref 8–23)
CALCIUM SERPL-MCNC: 9.3 MG/DL (ref 8.8–10.2)
CHLORIDE SERPL-SCNC: 102 MMOL/L (ref 98–107)
CO2 SERPL-SCNC: 22 MMOL/L (ref 20–29)
CREAT SERPL-MCNC: 0.89 MG/DL (ref 0.8–1.3)
DIFFERENTIAL METHOD BLD: ABNORMAL
EOSINOPHIL # BLD: 0.25 K/UL (ref 0–0.8)
EOSINOPHIL NFR BLD: 0.4 % (ref 0.5–7.8)
ERYTHROCYTE [DISTWIDTH] IN BLOOD BY AUTOMATED COUNT: 13.9 % (ref 11.9–14.6)
GLOBULIN SER CALC-MCNC: 2.6 G/DL (ref 2.3–3.5)
GLUCOSE SERPL-MCNC: 111 MG/DL (ref 70–99)
HCT VFR BLD AUTO: 43.1 % (ref 41.1–50.3)
HGB BLD-MCNC: 14.4 G/DL (ref 13.6–17.2)
IMM GRANULOCYTES # BLD AUTO: 0.13 K/UL (ref 0–0.5)
IMM GRANULOCYTES NFR BLD AUTO: 0.2 % (ref 0–5)
LDH SERPL L TO P-CCNC: 165 U/L (ref 127–281)
LYMPHOCYTES # BLD: 56.72 K/UL (ref 0.5–4.6)
LYMPHOCYTES NFR BLD: 89.6 % (ref 13–44)
MCH RBC QN AUTO: 30.1 PG (ref 26.1–32.9)
MCHC RBC AUTO-ENTMCNC: 33.4 G/DL (ref 31.4–35)
MCV RBC AUTO: 90.2 FL (ref 82–102)
MONOCYTES # BLD: 0.51 K/UL (ref 0.1–1.3)
MONOCYTES NFR BLD: 0.8 % (ref 4–12)
NEUTS SEG # BLD: 5.44 K/UL (ref 1.7–8.2)
NEUTS SEG NFR BLD: 8.6 % (ref 43–78)
NRBC # BLD: 0 K/UL (ref 0–0.2)
PLATELET # BLD AUTO: 106 K/UL (ref 150–450)
PLATELET COMMENT: ABNORMAL
PMV BLD AUTO: 10.1 FL (ref 9.4–12.3)
POTASSIUM SERPL-SCNC: 4.5 MMOL/L (ref 3.5–5.1)
PROT SERPL-MCNC: 6.6 G/DL (ref 6.3–8.2)
RBC # BLD AUTO: 4.78 M/UL (ref 4.23–5.6)
RBC MORPH BLD: ABNORMAL
SODIUM SERPL-SCNC: 135 MMOL/L (ref 136–145)
WBC # BLD AUTO: 63.3 K/UL (ref 4.3–11.1)
WBC MORPH BLD: ABNORMAL

## 2025-08-07 PROCEDURE — 83615 LACTATE (LD) (LDH) ENZYME: CPT

## 2025-08-07 PROCEDURE — 36415 COLL VENOUS BLD VENIPUNCTURE: CPT

## 2025-08-07 PROCEDURE — 85025 COMPLETE CBC W/AUTO DIFF WBC: CPT

## 2025-08-07 PROCEDURE — 80053 COMPREHEN METABOLIC PANEL: CPT

## 2025-08-11 ENCOUNTER — OFFICE VISIT (OUTPATIENT)
Dept: ONCOLOGY | Age: 60
End: 2025-08-11
Payer: COMMERCIAL

## 2025-08-11 VITALS
OXYGEN SATURATION: 97 % | DIASTOLIC BLOOD PRESSURE: 75 MMHG | HEART RATE: 74 BPM | TEMPERATURE: 97.4 F | SYSTOLIC BLOOD PRESSURE: 116 MMHG | HEIGHT: 67 IN | BODY MASS INDEX: 35.25 KG/M2 | RESPIRATION RATE: 16 BRPM | WEIGHT: 224.6 LBS

## 2025-08-11 DIAGNOSIS — D69.6 THROMBOCYTOPENIA, UNSPECIFIED: ICD-10-CM

## 2025-08-11 DIAGNOSIS — C91.10 CHRONIC LYMPHOCYTIC LEUKEMIA (CLL) GENETIC MUTATION VARIANT (HCC): Primary | ICD-10-CM

## 2025-08-11 PROCEDURE — 99214 OFFICE O/P EST MOD 30 MIN: CPT | Performed by: INTERNAL MEDICINE

## 2025-08-11 ASSESSMENT — PATIENT HEALTH QUESTIONNAIRE - PHQ9
2. FEELING DOWN, DEPRESSED OR HOPELESS: NOT AT ALL
SUM OF ALL RESPONSES TO PHQ QUESTIONS 1-9: 0
SUM OF ALL RESPONSES TO PHQ QUESTIONS 1-9: 0
1. LITTLE INTEREST OR PLEASURE IN DOING THINGS: NOT AT ALL
SUM OF ALL RESPONSES TO PHQ QUESTIONS 1-9: 0
SUM OF ALL RESPONSES TO PHQ QUESTIONS 1-9: 0